# Patient Record
Sex: MALE | Race: WHITE | NOT HISPANIC OR LATINO | Employment: OTHER | URBAN - METROPOLITAN AREA
[De-identification: names, ages, dates, MRNs, and addresses within clinical notes are randomized per-mention and may not be internally consistent; named-entity substitution may affect disease eponyms.]

---

## 2017-05-12 ENCOUNTER — RECORDS - HEALTHEAST (OUTPATIENT)
Dept: LAB | Facility: CLINIC | Age: 67
End: 2017-05-12

## 2017-05-12 LAB
CHOLEST SERPL-MCNC: 134 MG/DL
FASTING STATUS PATIENT QL REPORTED: YES
HDLC SERPL-MCNC: 37 MG/DL
LDLC SERPL CALC-MCNC: 48 MG/DL
PSA SERPL-MCNC: 1.9 NG/ML (ref 0–4.5)
TRIGL SERPL-MCNC: 244 MG/DL

## 2017-09-27 ENCOUNTER — RECORDS - HEALTHEAST (OUTPATIENT)
Dept: LAB | Facility: CLINIC | Age: 67
End: 2017-09-27

## 2017-09-27 LAB
CHOLEST SERPL-MCNC: 141 MG/DL
FASTING STATUS PATIENT QL REPORTED: YES
HDLC SERPL-MCNC: 41 MG/DL
LDLC SERPL CALC-MCNC: 56 MG/DL
TRIGL SERPL-MCNC: 220 MG/DL

## 2018-08-03 ENCOUNTER — RECORDS - HEALTHEAST (OUTPATIENT)
Dept: LAB | Facility: CLINIC | Age: 68
End: 2018-08-03

## 2018-08-03 LAB
ALBUMIN SERPL-MCNC: 4.2 G/DL (ref 3.5–5)
ALP SERPL-CCNC: 55 U/L (ref 45–120)
ALT SERPL W P-5'-P-CCNC: 39 U/L (ref 0–45)
ANION GAP SERPL CALCULATED.3IONS-SCNC: 10 MMOL/L (ref 5–18)
AST SERPL W P-5'-P-CCNC: 25 U/L (ref 0–40)
BASOPHILS # BLD AUTO: 0.1 THOU/UL (ref 0–0.2)
BASOPHILS NFR BLD AUTO: 1 % (ref 0–2)
BILIRUB SERPL-MCNC: 1 MG/DL (ref 0–1)
BUN SERPL-MCNC: 20 MG/DL (ref 8–22)
CALCIUM SERPL-MCNC: 9.2 MG/DL (ref 8.5–10.5)
CHLORIDE BLD-SCNC: 98 MMOL/L (ref 98–107)
CHOLEST SERPL-MCNC: 135 MG/DL
CO2 SERPL-SCNC: 28 MMOL/L (ref 22–31)
CREAT SERPL-MCNC: 0.89 MG/DL (ref 0.7–1.3)
EOSINOPHIL # BLD AUTO: 0.5 THOU/UL (ref 0–0.4)
EOSINOPHIL NFR BLD AUTO: 8 % (ref 0–6)
ERYTHROCYTE [DISTWIDTH] IN BLOOD BY AUTOMATED COUNT: 12.6 % (ref 11–14.5)
FASTING STATUS PATIENT QL REPORTED: YES
GFR SERPL CREATININE-BSD FRML MDRD: >60 ML/MIN/1.73M2
GLUCOSE BLD-MCNC: 195 MG/DL (ref 70–125)
HCT VFR BLD AUTO: 44 % (ref 40–54)
HDLC SERPL-MCNC: 39 MG/DL
HGB BLD-MCNC: 15.4 G/DL (ref 14–18)
LDLC SERPL CALC-MCNC: 53 MG/DL
LYMPHOCYTES # BLD AUTO: 1.9 THOU/UL (ref 0.8–4.4)
LYMPHOCYTES NFR BLD AUTO: 33 % (ref 20–40)
MCH RBC QN AUTO: 30.9 PG (ref 27–34)
MCHC RBC AUTO-ENTMCNC: 35 G/DL (ref 32–36)
MCV RBC AUTO: 88 FL (ref 80–100)
MONOCYTES # BLD AUTO: 0.5 THOU/UL (ref 0–0.9)
MONOCYTES NFR BLD AUTO: 9 % (ref 2–10)
NEUTROPHILS # BLD AUTO: 2.9 THOU/UL (ref 2–7.7)
NEUTROPHILS NFR BLD AUTO: 50 % (ref 50–70)
PLATELET # BLD AUTO: 255 THOU/UL (ref 140–440)
PMV BLD AUTO: 9.6 FL (ref 8.5–12.5)
POTASSIUM BLD-SCNC: 3.8 MMOL/L (ref 3.5–5)
PROT SERPL-MCNC: 6.8 G/DL (ref 6–8)
PSA SERPL-MCNC: 1.7 NG/ML (ref 0–4.5)
RBC # BLD AUTO: 4.98 MILL/UL (ref 4.4–6.2)
SODIUM SERPL-SCNC: 136 MMOL/L (ref 136–145)
TRIGL SERPL-MCNC: 215 MG/DL
TSH SERPL DL<=0.005 MIU/L-ACNC: 3.06 UIU/ML (ref 0.3–5)
WBC: 5.8 THOU/UL (ref 4–11)

## 2018-11-12 ENCOUNTER — RECORDS - HEALTHEAST (OUTPATIENT)
Dept: LAB | Facility: CLINIC | Age: 68
End: 2018-11-12

## 2018-11-12 LAB
ALBUMIN SERPL-MCNC: 4 G/DL (ref 3.5–5)
ALP SERPL-CCNC: 62 U/L (ref 45–120)
ALT SERPL W P-5'-P-CCNC: 31 U/L (ref 0–45)
ANION GAP SERPL CALCULATED.3IONS-SCNC: 11 MMOL/L (ref 5–18)
AST SERPL W P-5'-P-CCNC: 20 U/L (ref 0–40)
BASOPHILS # BLD AUTO: 0.1 THOU/UL (ref 0–0.2)
BASOPHILS NFR BLD AUTO: 1 % (ref 0–2)
BILIRUB SERPL-MCNC: 1 MG/DL (ref 0–1)
BUN SERPL-MCNC: 23 MG/DL (ref 8–22)
CALCIUM SERPL-MCNC: 10 MG/DL (ref 8.5–10.5)
CHLORIDE BLD-SCNC: 100 MMOL/L (ref 98–107)
CHOLEST SERPL-MCNC: 135 MG/DL
CO2 SERPL-SCNC: 30 MMOL/L (ref 22–31)
CREAT SERPL-MCNC: 0.96 MG/DL (ref 0.7–1.3)
EOSINOPHIL # BLD AUTO: 0.5 THOU/UL (ref 0–0.4)
EOSINOPHIL NFR BLD AUTO: 7 % (ref 0–6)
ERYTHROCYTE [DISTWIDTH] IN BLOOD BY AUTOMATED COUNT: 12.5 % (ref 11–14.5)
FASTING STATUS PATIENT QL REPORTED: YES
GFR SERPL CREATININE-BSD FRML MDRD: >60 ML/MIN/1.73M2
GLUCOSE BLD-MCNC: 163 MG/DL (ref 70–125)
HCT VFR BLD AUTO: 43 % (ref 40–54)
HDLC SERPL-MCNC: 41 MG/DL
HGB BLD-MCNC: 14.8 G/DL (ref 14–18)
LDLC SERPL CALC-MCNC: 59 MG/DL
LYMPHOCYTES # BLD AUTO: 2.1 THOU/UL (ref 0.8–4.4)
LYMPHOCYTES NFR BLD AUTO: 28 % (ref 20–40)
MCH RBC QN AUTO: 30.9 PG (ref 27–34)
MCHC RBC AUTO-ENTMCNC: 34.4 G/DL (ref 32–36)
MCV RBC AUTO: 90 FL (ref 80–100)
MONOCYTES # BLD AUTO: 0.6 THOU/UL (ref 0–0.9)
MONOCYTES NFR BLD AUTO: 8 % (ref 2–10)
NEUTROPHILS # BLD AUTO: 4.1 THOU/UL (ref 2–7.7)
NEUTROPHILS NFR BLD AUTO: 56 % (ref 50–70)
PLATELET # BLD AUTO: 263 THOU/UL (ref 140–440)
PMV BLD AUTO: 9.5 FL (ref 8.5–12.5)
POTASSIUM BLD-SCNC: 4.5 MMOL/L (ref 3.5–5)
PROT SERPL-MCNC: 7 G/DL (ref 6–8)
RBC # BLD AUTO: 4.79 MILL/UL (ref 4.4–6.2)
SODIUM SERPL-SCNC: 141 MMOL/L (ref 136–145)
TRIGL SERPL-MCNC: 174 MG/DL
WBC: 7.5 THOU/UL (ref 4–11)

## 2019-04-15 ENCOUNTER — RECORDS - HEALTHEAST (OUTPATIENT)
Dept: LAB | Facility: CLINIC | Age: 69
End: 2019-04-15

## 2019-04-15 LAB
ALBUMIN SERPL-MCNC: 4.3 G/DL (ref 3.5–5)
ALP SERPL-CCNC: 51 U/L (ref 45–120)
ALT SERPL W P-5'-P-CCNC: 44 U/L (ref 0–45)
ANION GAP SERPL CALCULATED.3IONS-SCNC: 11 MMOL/L (ref 5–18)
AST SERPL W P-5'-P-CCNC: 29 U/L (ref 0–40)
BASOPHILS # BLD AUTO: 0.1 THOU/UL (ref 0–0.2)
BASOPHILS NFR BLD AUTO: 1 % (ref 0–2)
BILIRUB SERPL-MCNC: 0.7 MG/DL (ref 0–1)
BUN SERPL-MCNC: 17 MG/DL (ref 8–22)
CALCIUM SERPL-MCNC: 9.7 MG/DL (ref 8.5–10.5)
CHLORIDE BLD-SCNC: 101 MMOL/L (ref 98–107)
CHOLEST SERPL-MCNC: 114 MG/DL
CO2 SERPL-SCNC: 28 MMOL/L (ref 22–31)
CREAT SERPL-MCNC: 1 MG/DL (ref 0.7–1.3)
EOSINOPHIL # BLD AUTO: 0.5 THOU/UL (ref 0–0.4)
EOSINOPHIL NFR BLD AUTO: 8 % (ref 0–6)
ERYTHROCYTE [DISTWIDTH] IN BLOOD BY AUTOMATED COUNT: 13.2 % (ref 11–14.5)
FASTING STATUS PATIENT QL REPORTED: YES
GFR SERPL CREATININE-BSD FRML MDRD: >60 ML/MIN/1.73M2
GLUCOSE BLD-MCNC: 184 MG/DL (ref 70–125)
HCT VFR BLD AUTO: 44.2 % (ref 40–54)
HDLC SERPL-MCNC: 39 MG/DL
HGB BLD-MCNC: 15 G/DL (ref 14–18)
LDLC SERPL CALC-MCNC: 42 MG/DL
LYMPHOCYTES # BLD AUTO: 1.8 THOU/UL (ref 0.8–4.4)
LYMPHOCYTES NFR BLD AUTO: 29 % (ref 20–40)
MCH RBC QN AUTO: 31.1 PG (ref 27–34)
MCHC RBC AUTO-ENTMCNC: 33.9 G/DL (ref 32–36)
MCV RBC AUTO: 92 FL (ref 80–100)
MONOCYTES # BLD AUTO: 0.5 THOU/UL (ref 0–0.9)
MONOCYTES NFR BLD AUTO: 8 % (ref 2–10)
NEUTROPHILS # BLD AUTO: 3.2 THOU/UL (ref 2–7.7)
NEUTROPHILS NFR BLD AUTO: 54 % (ref 50–70)
PLATELET # BLD AUTO: 264 THOU/UL (ref 140–440)
PMV BLD AUTO: 9.6 FL (ref 8.5–12.5)
POTASSIUM BLD-SCNC: 4.3 MMOL/L (ref 3.5–5)
PROT SERPL-MCNC: 7 G/DL (ref 6–8)
RBC # BLD AUTO: 4.82 MILL/UL (ref 4.4–6.2)
SODIUM SERPL-SCNC: 140 MMOL/L (ref 136–145)
TRIGL SERPL-MCNC: 166 MG/DL
TSH SERPL DL<=0.005 MIU/L-ACNC: 3.04 UIU/ML (ref 0.3–5)
WBC: 6 THOU/UL (ref 4–11)

## 2019-04-16 LAB — HBA1C MFR BLD: 7.9 % (ref 4.2–6.1)

## 2019-12-12 ENCOUNTER — RECORDS - HEALTHEAST (OUTPATIENT)
Dept: LAB | Facility: CLINIC | Age: 69
End: 2019-12-12

## 2019-12-12 LAB
ALBUMIN SERPL-MCNC: 4.6 G/DL (ref 3.5–5)
ALP SERPL-CCNC: 58 U/L (ref 45–120)
ALT SERPL W P-5'-P-CCNC: 32 U/L (ref 0–45)
ANION GAP SERPL CALCULATED.3IONS-SCNC: 11 MMOL/L (ref 5–18)
AST SERPL W P-5'-P-CCNC: 22 U/L (ref 0–40)
BILIRUB SERPL-MCNC: 1.2 MG/DL (ref 0–1)
BUN SERPL-MCNC: 22 MG/DL (ref 8–22)
CALCIUM SERPL-MCNC: 9.8 MG/DL (ref 8.5–10.5)
CHLORIDE BLD-SCNC: 98 MMOL/L (ref 98–107)
CHOLEST SERPL-MCNC: 117 MG/DL
CO2 SERPL-SCNC: 29 MMOL/L (ref 22–31)
CREAT SERPL-MCNC: 1.08 MG/DL (ref 0.7–1.3)
FASTING STATUS PATIENT QL REPORTED: YES
GFR SERPL CREATININE-BSD FRML MDRD: >60 ML/MIN/1.73M2
GLUCOSE BLD-MCNC: 175 MG/DL (ref 70–125)
HDLC SERPL-MCNC: 36 MG/DL
LDLC SERPL CALC-MCNC: 42 MG/DL
POTASSIUM BLD-SCNC: 4.2 MMOL/L (ref 3.5–5)
PROT SERPL-MCNC: 7.4 G/DL (ref 6–8)
SODIUM SERPL-SCNC: 138 MMOL/L (ref 136–145)
TRIGL SERPL-MCNC: 195 MG/DL

## 2019-12-16 ENCOUNTER — RECORDS - HEALTHEAST (OUTPATIENT)
Dept: LAB | Facility: CLINIC | Age: 69
End: 2019-12-16

## 2019-12-16 LAB — PSA SERPL-MCNC: 1.8 NG/ML (ref 0–4.5)

## 2020-04-17 ENCOUNTER — RECORDS - HEALTHEAST (OUTPATIENT)
Dept: LAB | Facility: CLINIC | Age: 70
End: 2020-04-17

## 2020-04-17 LAB
ALBUMIN SERPL-MCNC: 4.3 G/DL (ref 3.5–5)
ALP SERPL-CCNC: 63 U/L (ref 45–120)
ALT SERPL W P-5'-P-CCNC: 22 U/L (ref 0–45)
ANION GAP SERPL CALCULATED.3IONS-SCNC: 12 MMOL/L (ref 5–18)
AST SERPL W P-5'-P-CCNC: 18 U/L (ref 0–40)
BILIRUB SERPL-MCNC: 0.7 MG/DL (ref 0–1)
BUN SERPL-MCNC: 23 MG/DL (ref 8–22)
CALCIUM SERPL-MCNC: 9.7 MG/DL (ref 8.5–10.5)
CHLORIDE BLD-SCNC: 97 MMOL/L (ref 98–107)
CHOLEST SERPL-MCNC: 116 MG/DL
CO2 SERPL-SCNC: 28 MMOL/L (ref 22–31)
CREAT SERPL-MCNC: 0.93 MG/DL (ref 0.7–1.3)
FASTING STATUS PATIENT QL REPORTED: YES
GFR SERPL CREATININE-BSD FRML MDRD: >60 ML/MIN/1.73M2
GLUCOSE BLD-MCNC: 109 MG/DL (ref 70–125)
HDLC SERPL-MCNC: 37 MG/DL
LDLC SERPL CALC-MCNC: 56 MG/DL
POTASSIUM BLD-SCNC: 3.9 MMOL/L (ref 3.5–5)
PROT SERPL-MCNC: 7 G/DL (ref 6–8)
SODIUM SERPL-SCNC: 137 MMOL/L (ref 136–145)
TRIGL SERPL-MCNC: 116 MG/DL
TSH SERPL DL<=0.005 MIU/L-ACNC: 3.28 UIU/ML (ref 0.3–5)

## 2021-01-15 ENCOUNTER — AMBULATORY - HEALTHEAST (OUTPATIENT)
Dept: SURGERY | Facility: CLINIC | Age: 71
End: 2021-01-15

## 2021-01-15 DIAGNOSIS — Z11.59 ENCOUNTER FOR SCREENING FOR OTHER VIRAL DISEASES: ICD-10-CM

## 2021-02-09 ENCOUNTER — RECORDS - HEALTHEAST (OUTPATIENT)
Dept: LAB | Facility: CLINIC | Age: 71
End: 2021-02-09

## 2021-02-09 LAB
ALBUMIN SERPL-MCNC: 4.3 G/DL (ref 3.5–5)
ANION GAP SERPL CALCULATED.3IONS-SCNC: 8 MMOL/L (ref 5–18)
BUN SERPL-MCNC: 22 MG/DL (ref 8–28)
CALCIUM SERPL-MCNC: 9.2 MG/DL (ref 8.5–10.5)
CHLORIDE BLD-SCNC: 100 MMOL/L (ref 98–107)
CO2 SERPL-SCNC: 31 MMOL/L (ref 22–31)
CREAT SERPL-MCNC: 0.94 MG/DL (ref 0.7–1.3)
GFR SERPL CREATININE-BSD FRML MDRD: >60 ML/MIN/1.73M2
GLUCOSE BLD-MCNC: 162 MG/DL (ref 70–125)
PHOSPHATE SERPL-MCNC: 3.1 MG/DL (ref 2.5–4.5)
POTASSIUM BLD-SCNC: 3.7 MMOL/L (ref 3.5–5)
SODIUM SERPL-SCNC: 139 MMOL/L (ref 136–145)

## 2021-02-24 ENCOUNTER — RECORDS - HEALTHEAST (OUTPATIENT)
Dept: LAB | Facility: CLINIC | Age: 71
End: 2021-02-24

## 2021-02-24 LAB
SARS-COV-2 PCR COMMENT: NORMAL
SARS-COV-2 RNA SPEC QL NAA+PROBE: NEGATIVE
SARS-COV-2 VIRUS SPECIMEN SOURCE: NORMAL

## 2021-02-24 ASSESSMENT — MIFFLIN-ST. JEOR: SCORE: 1682.05

## 2021-02-25 ENCOUNTER — ANESTHESIA - HEALTHEAST (OUTPATIENT)
Dept: SURGERY | Facility: CLINIC | Age: 71
End: 2021-02-25

## 2021-02-26 ENCOUNTER — SURGERY - HEALTHEAST (OUTPATIENT)
Dept: SURGERY | Facility: CLINIC | Age: 71
End: 2021-02-26

## 2021-02-26 ASSESSMENT — MIFFLIN-ST. JEOR: SCORE: 1672.98

## 2021-05-25 ENCOUNTER — RECORDS - HEALTHEAST (OUTPATIENT)
Dept: ADMINISTRATIVE | Facility: CLINIC | Age: 71
End: 2021-05-25

## 2021-05-30 ENCOUNTER — RECORDS - HEALTHEAST (OUTPATIENT)
Dept: ADMINISTRATIVE | Facility: CLINIC | Age: 71
End: 2021-05-30

## 2021-06-05 VITALS — WEIGHT: 208 LBS | HEIGHT: 68 IN | BODY MASS INDEX: 31.52 KG/M2

## 2021-06-15 NOTE — ANESTHESIA PROCEDURE NOTES
Peripheral Block    Patient location during procedure: pre-op  Start time: 2/26/2021 7:00 AM  End time: 2/26/2021 7:01 AM  post-op analgesia per surgeon order as noted in medical record  Staffing:  Performing  Anesthesiologist: Jamilah Torres MD  Preanesthetic Checklist  Completed: patient identified, site marked, risks, benefits, and alternatives discussed, timeout performed, consent obtained, airway assessed, oxygen available, suction available, emergency drugs available and hand hygiene performed  Peripheral Block  Block type: other, superficial cervical plexus  Prep: ChloraPrep  Patient position: right lateral decubitus  Patient monitoring: cardiac monitor, continuous pulse oximetry, heart rate and blood pressure  Laterality: left  Injection technique: ultrasound guided    Ultrasound used to visualize needle placement in proximity to nerve being blocked: yes   US used to visualize anesthetic spread    Permanent ultrasound image captured for medical record  Sterile gel and probe cover used for ultrasound.  Needle  Needle type: Stimuplex   Needle gauge: 20G  Needle length: 4 in  no peripheral nerve catheter placed  Assessment  Injection assessment: no difficulty with injection, negative aspiration for heme, no paresthesia on injection and incremental injection

## 2021-06-15 NOTE — ANESTHESIA CARE TRANSFER NOTE
Last vitals:   Vitals:    02/26/21 0925   BP: 133/75   Pulse: 74   Resp: 11   Temp: 36.1  C (97  F)   SpO2: 94%     Patient's level of consciousness is drowsy  Spontaneous respirations: yes  Maintains airway independently: yes  Dentition unchanged: yes  Oropharynx: oropharynx clear of all foreign objects    QCDR Measures:  ASA# 20 - Surgical Safety Checklist: WHO surgical safety checklist completed prior to induction    PQRS# 430 - Adult PONV Prevention: 4558F - Pt received => 2 anti-emetic agents (different classes) preop & intraop  ASA# 8 - Peds PONV Prevention: NA - Not pediatric patient, not GA or 2 or more risk factors NOT present  PQRS# 424 - Jyotsna-op Temp Management: 4559F - At least one body temp DOCUMENTED => 35.5C or 95.9F within required timeframe  PQRS# 426 - PACU Transfer Protocol: - Transfer of care checklist used  ASA# 14 - Acute Post-op Pain: ASA14B - Patient did NOT experience pain >= 7 out of 10

## 2021-06-15 NOTE — ANESTHESIA PREPROCEDURE EVALUATION
Anesthesia Evaluation      Patient summary reviewed   No history of anesthetic complications     Airway   Mallampati: II  Neck ROM: full   Pulmonary - normal exam                          Cardiovascular - normal exam  (+) hypertension, CAD, ,      Neuro/Psych      Endo/Other    (+) diabetes mellitus, obesity,      GI/Hepatic/Renal            Dental - normal exam                        Anesthesia Plan  Planned anesthetic: peripheral nerve block and general endotracheal    ASA 2     Anesthetic plan and risks discussed with: patient  Anesthesia plan special considerations: antiemetics,   Post-op plan: routine recovery

## 2021-06-15 NOTE — ANESTHESIA PROCEDURE NOTES
Peripheral Block    Patient location during procedure: pre-op  Start time: 2/26/2021 6:57 AM  End time: 2/26/2021 6:59 AM  post-op analgesia per surgeon order as noted in medical record  Staffing:  Performing  Anesthesiologist: Jamilah Torres MD  Preanesthetic Checklist  Completed: patient identified, site marked, risks, benefits, and alternatives discussed, timeout performed, consent obtained, airway assessed, oxygen available, suction available, emergency drugs available and hand hygiene performed  Peripheral Block  Block type: brachial plexus, interscalene  Prep: ChloraPrep  Patient position: right lateral decubitus  Patient monitoring: cardiac monitor, continuous pulse oximetry, heart rate and blood pressure  Laterality: left  Injection technique: ultrasound guided    Ultrasound used to visualize needle placement in proximity to nerve being blocked: yes   US used to visualize anesthetic spread    Permanent ultrasound image captured for medical record  Sterile gel and probe cover used for ultrasound.  Needle  Needle type: Stimuplex   Needle gauge: 20G  Needle length: 4 in  no peripheral nerve catheter placed  Assessment  Injection assessment: no difficulty with injection, negative aspiration for heme, no paresthesia on injection and incremental injection

## 2021-06-15 NOTE — ANESTHESIA POSTPROCEDURE EVALUATION
Patient: Antonio Hidalgo  Procedure(s):  LEFT TOTAL SHOULDER ARTHROPLASTY (Left)  Anesthesia type: spinal    Patient location: PACU  Last vitals:   Vitals Value Taken Time   /74 02/26/21 1115   Temp 36.8  C (98.2  F) 02/26/21 1115   Pulse 75 02/26/21 1115   Resp 20 02/26/21 1115   SpO2 96 % 02/26/21 1115     Post vital signs: stable  Level of consciousness: awake and responds to simple questions  Post-anesthesia pain: pain controlled  Post-anesthesia nausea and vomiting: no  Pulmonary: unassisted, return to baseline  Cardiovascular: stable and blood pressure at baseline  Hydration: adequate  Anesthetic events: no    QCDR Measures:  ASA# 11 - Jyotsna-op Cardiac Arrest: ASA11B - Patient did NOT experience unanticipated cardiac arrest  ASA# 12 - Jyotsna-op Mortality Rate: ASA12B - Patient did NOT die  ASA# 13 - PACU Re-Intubation Rate: ASA13B - Patient did NOT require a new airway mgmt  ASA# 10 - Composite Anes Safety: ASA10A - No serious adverse event    Additional Notes:

## 2021-12-01 ENCOUNTER — LAB REQUISITION (OUTPATIENT)
Dept: LAB | Facility: CLINIC | Age: 71
End: 2021-12-01

## 2021-12-01 DIAGNOSIS — I10 ESSENTIAL (PRIMARY) HYPERTENSION: ICD-10-CM

## 2021-12-01 DIAGNOSIS — E78.5 HYPERLIPIDEMIA, UNSPECIFIED: ICD-10-CM

## 2021-12-01 LAB
ALBUMIN SERPL-MCNC: 4.1 G/DL (ref 3.5–5)
ALP SERPL-CCNC: 48 U/L (ref 45–120)
ALT SERPL W P-5'-P-CCNC: 23 U/L (ref 0–45)
ANION GAP SERPL CALCULATED.3IONS-SCNC: 10 MMOL/L (ref 5–18)
AST SERPL W P-5'-P-CCNC: 19 U/L (ref 0–40)
BILIRUB SERPL-MCNC: 0.7 MG/DL (ref 0–1)
BUN SERPL-MCNC: 28 MG/DL (ref 8–28)
CALCIUM SERPL-MCNC: 9.7 MG/DL (ref 8.5–10.5)
CHLORIDE BLD-SCNC: 100 MMOL/L (ref 98–107)
CHOLEST SERPL-MCNC: 129 MG/DL
CO2 SERPL-SCNC: 30 MMOL/L (ref 22–31)
CREAT SERPL-MCNC: 1.15 MG/DL (ref 0.7–1.3)
ERYTHROCYTE [DISTWIDTH] IN BLOOD BY AUTOMATED COUNT: 12.5 % (ref 10–15)
GFR SERPL CREATININE-BSD FRML MDRD: 64 ML/MIN/1.73M2
GLUCOSE BLD-MCNC: 132 MG/DL (ref 70–125)
HCT VFR BLD AUTO: 46.3 % (ref 40–53)
HDLC SERPL-MCNC: 46 MG/DL
HGB BLD-MCNC: 15.9 G/DL (ref 13.3–17.7)
LDLC SERPL CALC-MCNC: 51 MG/DL
MCH RBC QN AUTO: 31 PG (ref 26.5–33)
MCHC RBC AUTO-ENTMCNC: 34.3 G/DL (ref 31.5–36.5)
MCV RBC AUTO: 90 FL (ref 78–100)
PLATELET # BLD AUTO: 229 10E3/UL (ref 150–450)
POTASSIUM BLD-SCNC: 4.2 MMOL/L (ref 3.5–5)
PROT SERPL-MCNC: 6.7 G/DL (ref 6–8)
RBC # BLD AUTO: 5.13 10E6/UL (ref 4.4–5.9)
SODIUM SERPL-SCNC: 140 MMOL/L (ref 136–145)
TRIGL SERPL-MCNC: 159 MG/DL
TSH SERPL DL<=0.005 MIU/L-ACNC: 4.08 UIU/ML (ref 0.3–5)
WBC # BLD AUTO: 6.4 10E3/UL (ref 4–11)

## 2021-12-01 PROCEDURE — 85027 COMPLETE CBC AUTOMATED: CPT | Performed by: FAMILY MEDICINE

## 2021-12-01 PROCEDURE — 80053 COMPREHEN METABOLIC PANEL: CPT | Performed by: FAMILY MEDICINE

## 2021-12-01 PROCEDURE — 80061 LIPID PANEL: CPT | Performed by: FAMILY MEDICINE

## 2021-12-01 PROCEDURE — 84443 ASSAY THYROID STIM HORMONE: CPT | Performed by: FAMILY MEDICINE

## 2021-12-06 ENCOUNTER — LAB REQUISITION (OUTPATIENT)
Dept: LAB | Facility: CLINIC | Age: 71
End: 2021-12-06

## 2021-12-06 DIAGNOSIS — Z12.5 ENCOUNTER FOR SCREENING FOR MALIGNANT NEOPLASM OF PROSTATE: ICD-10-CM

## 2021-12-06 LAB — PSA SERPL-MCNC: 1.92 UG/L (ref 0–6.5)

## 2021-12-06 PROCEDURE — G0103 PSA SCREENING: HCPCS | Performed by: FAMILY MEDICINE

## 2022-05-13 ENCOUNTER — LAB REQUISITION (OUTPATIENT)
Dept: LAB | Facility: CLINIC | Age: 72
End: 2022-05-13

## 2022-05-13 DIAGNOSIS — E11.9 TYPE 2 DIABETES MELLITUS WITHOUT COMPLICATIONS (H): ICD-10-CM

## 2022-05-13 DIAGNOSIS — I10 ESSENTIAL (PRIMARY) HYPERTENSION: ICD-10-CM

## 2022-05-13 DIAGNOSIS — E78.5 HYPERLIPIDEMIA, UNSPECIFIED: ICD-10-CM

## 2022-05-13 LAB
ALBUMIN SERPL-MCNC: 4.1 G/DL (ref 3.5–5)
ALP SERPL-CCNC: 45 U/L (ref 45–120)
ALT SERPL W P-5'-P-CCNC: 30 U/L (ref 0–45)
ANION GAP SERPL CALCULATED.3IONS-SCNC: 13 MMOL/L (ref 5–18)
AST SERPL W P-5'-P-CCNC: 26 U/L (ref 0–40)
BASOPHILS # BLD AUTO: 0.1 10E3/UL (ref 0–0.2)
BASOPHILS NFR BLD AUTO: 1 %
BILIRUB SERPL-MCNC: 1 MG/DL (ref 0–1)
BUN SERPL-MCNC: 21 MG/DL (ref 8–28)
CALCIUM SERPL-MCNC: 9.6 MG/DL (ref 8.5–10.5)
CHLORIDE BLD-SCNC: 100 MMOL/L (ref 98–107)
CHOLEST SERPL-MCNC: 119 MG/DL
CO2 SERPL-SCNC: 28 MMOL/L (ref 22–31)
CREAT SERPL-MCNC: 0.96 MG/DL (ref 0.7–1.3)
EOSINOPHIL # BLD AUTO: 0.3 10E3/UL (ref 0–0.7)
EOSINOPHIL NFR BLD AUTO: 4 %
ERYTHROCYTE [DISTWIDTH] IN BLOOD BY AUTOMATED COUNT: 12.8 % (ref 10–15)
FASTING STATUS PATIENT QL REPORTED: NORMAL
GFR SERPL CREATININE-BSD FRML MDRD: 85 ML/MIN/1.73M2
GLUCOSE BLD-MCNC: 107 MG/DL (ref 70–125)
HBA1C MFR BLD: 6.1 %
HCT VFR BLD AUTO: 44.2 % (ref 40–53)
HDLC SERPL-MCNC: 52 MG/DL
HGB BLD-MCNC: 15.3 G/DL (ref 13.3–17.7)
IMM GRANULOCYTES # BLD: 0 10E3/UL
IMM GRANULOCYTES NFR BLD: 0 %
LDLC SERPL CALC-MCNC: 50 MG/DL
LYMPHOCYTES # BLD AUTO: 2.1 10E3/UL (ref 0.8–5.3)
LYMPHOCYTES NFR BLD AUTO: 29 %
MCH RBC QN AUTO: 31.9 PG (ref 26.5–33)
MCHC RBC AUTO-ENTMCNC: 34.6 G/DL (ref 31.5–36.5)
MCV RBC AUTO: 92 FL (ref 78–100)
MONOCYTES # BLD AUTO: 0.6 10E3/UL (ref 0–1.3)
MONOCYTES NFR BLD AUTO: 8 %
NEUTROPHILS # BLD AUTO: 4.3 10E3/UL (ref 1.6–8.3)
NEUTROPHILS NFR BLD AUTO: 58 %
NRBC # BLD AUTO: 0 10E3/UL
NRBC BLD AUTO-RTO: 0 /100
PLATELET # BLD AUTO: 271 10E3/UL (ref 150–450)
POTASSIUM BLD-SCNC: 4.1 MMOL/L (ref 3.5–5)
PROT SERPL-MCNC: 6.6 G/DL (ref 6–8)
RBC # BLD AUTO: 4.8 10E6/UL (ref 4.4–5.9)
SODIUM SERPL-SCNC: 141 MMOL/L (ref 136–145)
TRIGL SERPL-MCNC: 84 MG/DL
TSH SERPL DL<=0.005 MIU/L-ACNC: 2.48 UIU/ML (ref 0.3–5)
WBC # BLD AUTO: 7.4 10E3/UL (ref 4–11)

## 2022-05-13 PROCEDURE — 84443 ASSAY THYROID STIM HORMONE: CPT | Performed by: STUDENT IN AN ORGANIZED HEALTH CARE EDUCATION/TRAINING PROGRAM

## 2022-05-13 PROCEDURE — 85004 AUTOMATED DIFF WBC COUNT: CPT | Performed by: STUDENT IN AN ORGANIZED HEALTH CARE EDUCATION/TRAINING PROGRAM

## 2022-05-13 PROCEDURE — 83036 HEMOGLOBIN GLYCOSYLATED A1C: CPT | Performed by: STUDENT IN AN ORGANIZED HEALTH CARE EDUCATION/TRAINING PROGRAM

## 2022-05-13 PROCEDURE — 80053 COMPREHEN METABOLIC PANEL: CPT | Performed by: STUDENT IN AN ORGANIZED HEALTH CARE EDUCATION/TRAINING PROGRAM

## 2022-05-13 PROCEDURE — 80061 LIPID PANEL: CPT | Performed by: STUDENT IN AN ORGANIZED HEALTH CARE EDUCATION/TRAINING PROGRAM

## 2022-06-12 ENCOUNTER — LAB REQUISITION (OUTPATIENT)
Dept: LAB | Facility: CLINIC | Age: 72
End: 2022-06-12

## 2022-06-12 PROCEDURE — 87086 URINE CULTURE/COLONY COUNT: CPT | Performed by: FAMILY MEDICINE

## 2022-06-14 LAB — BACTERIA UR CULT: NORMAL

## 2022-12-21 ENCOUNTER — LAB REQUISITION (OUTPATIENT)
Dept: LAB | Facility: CLINIC | Age: 72
End: 2022-12-21

## 2022-12-21 DIAGNOSIS — I25.119 ATHEROSCLEROTIC HEART DISEASE OF NATIVE CORONARY ARTERY WITH UNSPECIFIED ANGINA PECTORIS (H): ICD-10-CM

## 2022-12-21 DIAGNOSIS — I10 ESSENTIAL (PRIMARY) HYPERTENSION: ICD-10-CM

## 2022-12-21 DIAGNOSIS — E78.5 HYPERLIPIDEMIA, UNSPECIFIED: ICD-10-CM

## 2022-12-21 LAB
ALBUMIN SERPL BCG-MCNC: 4.4 G/DL (ref 3.5–5.2)
ALP SERPL-CCNC: 73 U/L (ref 40–129)
ALT SERPL W P-5'-P-CCNC: 35 U/L (ref 10–50)
ANION GAP SERPL CALCULATED.3IONS-SCNC: 15 MMOL/L (ref 7–15)
AST SERPL W P-5'-P-CCNC: 28 U/L (ref 10–50)
BILIRUB SERPL-MCNC: 0.7 MG/DL
BUN SERPL-MCNC: 14.4 MG/DL (ref 8–23)
CALCIUM SERPL-MCNC: 9.9 MG/DL (ref 8.8–10.2)
CHLORIDE SERPL-SCNC: 96 MMOL/L (ref 98–107)
CHOLEST SERPL-MCNC: 110 MG/DL
CREAT SERPL-MCNC: 0.95 MG/DL (ref 0.67–1.17)
DEPRECATED HCO3 PLAS-SCNC: 27 MMOL/L (ref 22–29)
GFR SERPL CREATININE-BSD FRML MDRD: 85 ML/MIN/1.73M2
GLUCOSE SERPL-MCNC: 135 MG/DL (ref 70–99)
HDLC SERPL-MCNC: 44 MG/DL
LDLC SERPL CALC-MCNC: 35 MG/DL
NONHDLC SERPL-MCNC: 66 MG/DL
POTASSIUM SERPL-SCNC: 4.7 MMOL/L (ref 3.4–5.3)
PROT SERPL-MCNC: 7 G/DL (ref 6.4–8.3)
SODIUM SERPL-SCNC: 138 MMOL/L (ref 136–145)
TRIGL SERPL-MCNC: 154 MG/DL

## 2022-12-21 PROCEDURE — 80053 COMPREHEN METABOLIC PANEL: CPT | Performed by: STUDENT IN AN ORGANIZED HEALTH CARE EDUCATION/TRAINING PROGRAM

## 2022-12-21 PROCEDURE — 80061 LIPID PANEL: CPT | Performed by: STUDENT IN AN ORGANIZED HEALTH CARE EDUCATION/TRAINING PROGRAM

## 2023-06-19 ENCOUNTER — LAB REQUISITION (OUTPATIENT)
Dept: LAB | Facility: CLINIC | Age: 73
End: 2023-06-19

## 2023-06-19 ENCOUNTER — TRANSFERRED RECORDS (OUTPATIENT)
Dept: HEALTH INFORMATION MANAGEMENT | Facility: CLINIC | Age: 73
End: 2023-06-19
Payer: COMMERCIAL

## 2023-06-19 ENCOUNTER — MEDICAL CORRESPONDENCE (OUTPATIENT)
Dept: HEALTH INFORMATION MANAGEMENT | Facility: CLINIC | Age: 73
End: 2023-06-19
Payer: COMMERCIAL

## 2023-06-19 DIAGNOSIS — Z12.5 ENCOUNTER FOR SCREENING FOR MALIGNANT NEOPLASM OF PROSTATE: ICD-10-CM

## 2023-06-19 DIAGNOSIS — E78.5 HYPERLIPIDEMIA, UNSPECIFIED: ICD-10-CM

## 2023-06-19 DIAGNOSIS — I10 ESSENTIAL (PRIMARY) HYPERTENSION: ICD-10-CM

## 2023-06-19 DIAGNOSIS — E11.9 TYPE 2 DIABETES MELLITUS WITHOUT COMPLICATIONS (H): ICD-10-CM

## 2023-06-19 PROCEDURE — 80053 COMPREHEN METABOLIC PANEL: CPT | Performed by: STUDENT IN AN ORGANIZED HEALTH CARE EDUCATION/TRAINING PROGRAM

## 2023-06-19 PROCEDURE — G0103 PSA SCREENING: HCPCS | Performed by: STUDENT IN AN ORGANIZED HEALTH CARE EDUCATION/TRAINING PROGRAM

## 2023-06-19 PROCEDURE — 80061 LIPID PANEL: CPT | Performed by: STUDENT IN AN ORGANIZED HEALTH CARE EDUCATION/TRAINING PROGRAM

## 2023-06-20 ENCOUNTER — TRANSCRIBE ORDERS (OUTPATIENT)
Dept: OTHER | Age: 73
End: 2023-06-20

## 2023-06-20 DIAGNOSIS — R25.1 TREMOR OF BOTH HANDS: Primary | ICD-10-CM

## 2023-06-20 LAB
ALBUMIN SERPL BCG-MCNC: 4.6 G/DL (ref 3.5–5.2)
ALP SERPL-CCNC: 51 U/L (ref 40–129)
ALT SERPL W P-5'-P-CCNC: 36 U/L (ref 0–70)
ANION GAP SERPL CALCULATED.3IONS-SCNC: 13 MMOL/L (ref 7–15)
AST SERPL W P-5'-P-CCNC: 27 U/L (ref 0–45)
BILIRUB SERPL-MCNC: 0.7 MG/DL
BUN SERPL-MCNC: 20.7 MG/DL (ref 8–23)
CALCIUM SERPL-MCNC: 9.5 MG/DL (ref 8.8–10.2)
CHLORIDE SERPL-SCNC: 98 MMOL/L (ref 98–107)
CHOLEST SERPL-MCNC: 116 MG/DL
CREAT SERPL-MCNC: 0.93 MG/DL (ref 0.67–1.17)
DEPRECATED HCO3 PLAS-SCNC: 26 MMOL/L (ref 22–29)
GFR SERPL CREATININE-BSD FRML MDRD: 87 ML/MIN/1.73M2
GLUCOSE SERPL-MCNC: 163 MG/DL (ref 70–99)
HDLC SERPL-MCNC: 44 MG/DL
LDLC SERPL CALC-MCNC: 41 MG/DL
NONHDLC SERPL-MCNC: 72 MG/DL
POTASSIUM SERPL-SCNC: 4.1 MMOL/L (ref 3.4–5.3)
PROT SERPL-MCNC: 6.7 G/DL (ref 6.4–8.3)
PSA SERPL DL<=0.01 NG/ML-MCNC: 1.46 NG/ML (ref 0–6.5)
SODIUM SERPL-SCNC: 137 MMOL/L (ref 136–145)
TRIGL SERPL-MCNC: 154 MG/DL

## 2023-06-30 PROBLEM — E78.5 DYSLIPIDEMIA: Status: ACTIVE | Noted: 2018-11-09

## 2023-06-30 PROBLEM — E11.9 DM2 (DIABETES MELLITUS, TYPE 2) (H): Status: ACTIVE | Noted: 2018-11-09

## 2023-06-30 PROBLEM — I10 ESSENTIAL HYPERTENSION: Status: ACTIVE | Noted: 2018-11-09

## 2023-06-30 PROBLEM — I25.10 ATHEROSCLEROSIS OF NATIVE CORONARY ARTERY OF NATIVE HEART WITHOUT ANGINA PECTORIS: Status: ACTIVE | Noted: 2023-06-30

## 2023-06-30 NOTE — PROGRESS NOTES
NEUROLOGY CONSULTATION NOTE       [facility] [place]  [address] [place] [address]  Tel: [phone]  Fax: [phone]  [email or web address]     Antonio Hidalgo,  1950, MRN [id number]  PCP: Blake Silva  Date: 2023     ASSESSMENT & PLAN     Visit Diagnosis  1. Tremor     Benign essential tremor  73-year-old male with HTN, HLD, CAD, DM 2, diabetic polyneuropathy with benign essential tremor.  I have recommended:    1.  Check copper, ceruloplasmin and TSH  2.  Start primidone 50 mg at bedtime.  He will update us with his progress and will titrate the dose of primidone depending on his response  3.  Continue metoprolol for blood pressure that likely is helping his tremors also  4.  Follow-up in 4 months    Thank you again for this referral, please feel free to contact me if you have any questions.    Stewart Gould MD  [facility]  (Formerly, [facility]     REASON FOR CONSULTATION Tremors        HISTORY OF PRESENT ILLNESS     We have been requested by Dr. Ochoa to evaluate Antonio Hidalgo who is a 73 year old  male for Tremor    Patient is a 73-year-old male with history of HTN, HLD, CAD, DM 2, diabetic polyneuropathy was referred for evaluation of bilateral hand tremors.  He was initially seen in our clinic in  when he reported intention tremors bilaterally.  He denied any family history.  Stress and anxiety would make his symptoms worse.  As his symptoms were minimal he was told to use alcohol when these tremors are too problematic.  He started drinking at least 1 glass of wine at night that was helpful but progressively his symptoms have gotten worse to a point that he has to hold onto glasses with his both hands.  He denies any head tremor or any voice tremor.  He denies any family history.  In social situations or when he is under stress his symptoms tend to get worse.  He has noticed  decline in his penmanship and mostly prints.  There is no history of any balance issues or any bradykinesia.     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    Surgical History:  He  has a past surgical history that includes joint replacement (Left); Arthroscopy shoulder rotator cuff repair (Right); and RECONSTR TOTAL SHOULDER IMPLANT (Left, 2/26/2021).     SOCIAL HISTORY     Reviewed, and he  reports that he has never smoked. He has never used smokeless tobacco. He reports that he does not currently use alcohol. He reports that he does not use drugs.     FAMILY HISTORY     Reviewed, and family history includes Myocardial Infarction in his brother and father.     ALLERGIES     Allergies   Allergen Reactions     Penicillins Swelling and Rash     Facial swelling         REVIEW OF SYSTEMS     A 12 point review of system was performed and was negative except as outlined in the history of present illness.     HOME MEDICATIONS     Current Outpatient Rx   Medication Sig Dispense Refill     acetaminophen (TYLENOL) 325 MG tablet [ACETAMINOPHEN (TYLENOL) 325 MG TABLET] Take 2 tablets (650 mg total) by mouth every 4 (four) hours as needed for pain (mild pain). 100 tablet 0     amLODIPine (NORVASC) 2.5 MG tablet [AMLODIPINE (NORVASC) 2.5 MG TABLET] Take 2.5 mg by mouth daily.       aspirin 81 MG EC tablet Take 81 mg by mouth daily       folic acid (FOLVITE) 1 MG tablet [FOLIC ACID (FOLVITE) 1 MG TABLET] Take 1 mg by mouth  "daily.       gabapentin (NEURONTIN) 100 MG capsule Take 100 mg by mouth At Bedtime       glipiZIDE (GLUCOTROL) 10 MG tablet [GLIPIZIDE (GLUCOTROL) 10 MG TABLET] Take 10 mg by mouth 2 (two) times a day before meals.       hydroCHLOROthiazide (HYDRODIURIL) 25 MG tablet [HYDROCHLOROTHIAZIDE (HYDRODIURIL) 25 MG TABLET] Take 25 mg by mouth daily.       irbesartan (AVAPRO) 300 MG tablet [IRBESARTAN (AVAPRO) 300 MG TABLET] Take 300 mg by mouth daily.       metFORMIN (GLUCOPHAGE) 500 MG tablet [METFORMIN (GLUCOPHAGE) 500 MG TABLET] Take 1,000 mg by mouth 2 (two) times a day with meals.       metoprolol succinate (TOPROL-XL) 100 MG 24 hr tablet [METOPROLOL SUCCINATE (TOPROL-XL) 100 MG 24 HR TABLET] Take 100 mg by mouth daily.       rosuvastatin (CRESTOR) 20 MG tablet [ROSUVASTATIN (CRESTOR) 20 MG TABLET] Take 20 mg by mouth at bedtime.       tamsulosin (FLOMAX) 0.4 mg cap [TAMSULOSIN (FLOMAX) 0.4 MG CAP] Take 0.4 mg by mouth Daily after breakfast.        primidone (MYSOLINE) 50 MG tablet Take 1 tablet (50 mg) by mouth At Bedtime 30 tablet 11         PHYSICAL EXAM     Vital signs  /77 (BP Location: Right arm, Patient Position: Sitting)   Pulse 66   Ht 1.727 m (5' 8\")   Wt 104.3 kg (230 lb)   BMI 34.97 kg/m      Weight:   230 lbs 0 oz    Middle-age gentleman who is alert and oriented vital signs are reviewed and documented in electronic medical record.  Neck supple.  Neurologically speech normal cranial nerves II through XII are intact motor strength 5/5 reflexes 1+ absent at ankles and knees toes downgoing.  Sensation decreased to light touch pinprick below knees.  He has bilateral intention tremor with dysmetria on finger-nose testing.  Gait normal.  Armswing normal.  No imbalance on turning.     PERTINENT DIAGNOSTIC STUDIES     Following studies were reviewed:     No recent imaging studies to review     PERTINENT LABS  Following labs were reviewed:  Lab Requisition on 06/19/2023   Component Date Value Ref Range " Status     Cholesterol 06/19/2023 116  <200 mg/dL Final     Triglycerides 06/19/2023 154 (H)  <150 mg/dL Final     Direct Measure HDL 06/19/2023 44  >=40 mg/dL Final     LDL Cholesterol Calculated 06/19/2023 41  <=100 mg/dL Final     Non HDL Cholesterol 06/19/2023 72  <130 mg/dL Final     Sodium 06/19/2023 137  136 - 145 mmol/L Final     Potassium 06/19/2023 4.1  3.4 - 5.3 mmol/L Final     Chloride 06/19/2023 98  98 - 107 mmol/L Final     Carbon Dioxide (CO2) 06/19/2023 26  22 - 29 mmol/L Final     Anion Gap 06/19/2023 13  7 - 15 mmol/L Final     Urea Nitrogen 06/19/2023 20.7  8.0 - 23.0 mg/dL Final     Creatinine 06/19/2023 0.93  0.67 - 1.17 mg/dL Final     Calcium 06/19/2023 9.5  8.8 - 10.2 mg/dL Final     Glucose 06/19/2023 163 (H)  70 - 99 mg/dL Final     Alkaline Phosphatase 06/19/2023 51  40 - 129 U/L Final     AST 06/19/2023 27  0 - 45 U/L Final     ALT 06/19/2023 36  0 - 70 U/L Final     Protein Total 06/19/2023 6.7  6.4 - 8.3 g/dL Final     Albumin 06/19/2023 4.6  3.5 - 5.2 g/dL Final     Bilirubin Total 06/19/2023 0.7  <=1.2 mg/dL Final     GFR Estimate 06/19/2023 87  >60 mL/min/1.73m2 Final     Prostate Specific Antigen Screen 06/19/2023 1.46  0.00 - 6.50 ng/mL Final        Total time spent for face to face visit, reviewing labs/imaging studies, counseling and coordination of care was: 1 Hour spent on the date of the encounter doing chart review, review of outside records, review of test results, interpretation of tests, patient visit and documentation       This note was dictated using voice recognition software.  Any grammatical or context distortions are unintentional and inherent to the software.    Orders Placed This Encounter   Procedures     Ceruloplasmin     Copper level     TSH with free T4 reflex      New Prescriptions    PRIMIDONE (MYSOLINE) 50 MG TABLET    Take 1 tablet (50 mg) by mouth At Bedtime      Modified Medications    No medications on file

## 2023-07-03 ENCOUNTER — OFFICE VISIT (OUTPATIENT)
Dept: NEUROLOGY | Facility: CLINIC | Age: 73
End: 2023-07-03
Payer: COMMERCIAL

## 2023-07-03 VITALS
SYSTOLIC BLOOD PRESSURE: 135 MMHG | HEIGHT: 68 IN | DIASTOLIC BLOOD PRESSURE: 77 MMHG | BODY MASS INDEX: 34.86 KG/M2 | WEIGHT: 230 LBS | HEART RATE: 66 BPM

## 2023-07-03 DIAGNOSIS — R74.8 ABNORMAL LEVELS OF OTHER SERUM ENZYMES: ICD-10-CM

## 2023-07-03 DIAGNOSIS — G25.0 BENIGN ESSENTIAL TREMOR: Primary | ICD-10-CM

## 2023-07-03 PROBLEM — E11.42 DIABETIC POLYNEUROPATHY ASSOCIATED WITH TYPE 2 DIABETES MELLITUS (H): Status: ACTIVE | Noted: 2023-07-03

## 2023-07-03 PROCEDURE — 99205 OFFICE O/P NEW HI 60 MIN: CPT | Performed by: PSYCHIATRY & NEUROLOGY

## 2023-07-03 RX ORDER — PRIMIDONE 50 MG/1
50 TABLET ORAL AT BEDTIME
Qty: 30 TABLET | Refills: 11 | Status: SHIPPED | OUTPATIENT
Start: 2023-07-03 | End: 2023-11-27

## 2023-07-03 RX ORDER — GABAPENTIN 100 MG/1
100 CAPSULE ORAL AT BEDTIME
COMMUNITY

## 2023-07-03 RX ORDER — ASPIRIN 81 MG/1
81 TABLET ORAL DAILY
COMMUNITY

## 2023-07-03 NOTE — LETTER
7/3/2023         RE: Antonio Hidalgo  [address]  [facility] [address]        Dear Colleague,    Thank you for referring your patient, Antonio Hidalgo, to the [facility] [place]. Please see a copy of my visit note below.    NEUROLOGY CONSULTATION NOTE       [facility] [place]  [address] [place] [address]  Tel: [phone]  Fax: [phone]  [email or web address]     Antonio Hidalgo,  1950, MRN [id number]  PCP: Blake Silva  Date: 2023     ASSESSMENT & PLAN     Visit Diagnosis  1. Tremor     Benign essential tremor  73-year-old male with HTN, HLD, CAD, DM 2, diabetic polyneuropathy with benign essential tremor.  I have recommended:    1.  Check copper, ceruloplasmin and TSH  2.  Start primidone 50 mg at bedtime.  He will update us with his progress and will titrate the dose of primidone depending on his response  3.  Continue metoprolol for blood pressure that likely is helping his tremors also  4.  Follow-up in 4 months    Thank you again for this referral, please feel free to contact me if you have any questions.    Stewart Gould MD  [facility]  (Formerly, [facility])     REASON FOR CONSULTATION Tremors        HISTORY OF PRESENT ILLNESS     We have been requested by Dr. Ochoa to evaluate Antonio Hidalgo who is a 73 year old  male for Tremor    Patient is a 73-year-old male with history of HTN, HLD, CAD, DM 2, diabetic polyneuropathy was referred for evaluation of bilateral hand tremors.  He was initially seen in our clinic in  when he reported intention tremors bilaterally.  He denied any family history.  Stress and anxiety would make his symptoms worse.  As his symptoms were minimal he was told to use alcohol when these tremors are too problematic.  He started drinking at least 1 glass of wine at night that was helpful but progressively his symptoms  have gotten worse to a point that he has to hold onto glasses with his both hands.  He denies any head tremor or any voice tremor.  He denies any family history.  In social situations or when he is under stress his symptoms tend to get worse.  He has noticed decline in his penmanship and mostly prints.  There is no history of any balance issues or any bradykinesia.     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    Surgical History:  He  has a past surgical history that includes joint replacement (Left); Arthroscopy shoulder rotator cuff repair (Right); and RECONSTR TOTAL SHOULDER IMPLANT (Left, 2/26/2021).     SOCIAL HISTORY     Reviewed, and he  reports that he has never smoked. He has never used smokeless tobacco. He reports that he does not currently use alcohol. He reports that he does not use drugs.     FAMILY HISTORY     Reviewed, and family history includes Myocardial Infarction in his brother and father.     ALLERGIES     Allergies   Allergen Reactions     Penicillins Swelling and Rash     Facial swelling         REVIEW OF SYSTEMS     A 12 point review of system was performed and was negative except as outlined in the history of present illness.     HOME MEDICATIONS     Current Outpatient Rx   Medication Sig Dispense Refill     acetaminophen (TYLENOL) 325 MG tablet [ACETAMINOPHEN (TYLENOL) 325 MG TABLET] Take 2 tablets (650 mg total) by mouth every 4 (four) hours as needed for pain (mild pain). 100 tablet 0  "    amLODIPine (NORVASC) 2.5 MG tablet [AMLODIPINE (NORVASC) 2.5 MG TABLET] Take 2.5 mg by mouth daily.       aspirin 81 MG EC tablet Take 81 mg by mouth daily       folic acid (FOLVITE) 1 MG tablet [FOLIC ACID (FOLVITE) 1 MG TABLET] Take 1 mg by mouth daily.       gabapentin (NEURONTIN) 100 MG capsule Take 100 mg by mouth At Bedtime       glipiZIDE (GLUCOTROL) 10 MG tablet [GLIPIZIDE (GLUCOTROL) 10 MG TABLET] Take 10 mg by mouth 2 (two) times a day before meals.       hydroCHLOROthiazide (HYDRODIURIL) 25 MG tablet [HYDROCHLOROTHIAZIDE (HYDRODIURIL) 25 MG TABLET] Take 25 mg by mouth daily.       irbesartan (AVAPRO) 300 MG tablet [IRBESARTAN (AVAPRO) 300 MG TABLET] Take 300 mg by mouth daily.       metFORMIN (GLUCOPHAGE) 500 MG tablet [METFORMIN (GLUCOPHAGE) 500 MG TABLET] Take 1,000 mg by mouth 2 (two) times a day with meals.       metoprolol succinate (TOPROL-XL) 100 MG 24 hr tablet [METOPROLOL SUCCINATE (TOPROL-XL) 100 MG 24 HR TABLET] Take 100 mg by mouth daily.       rosuvastatin (CRESTOR) 20 MG tablet [ROSUVASTATIN (CRESTOR) 20 MG TABLET] Take 20 mg by mouth at bedtime.       tamsulosin (FLOMAX) 0.4 mg cap [TAMSULOSIN (FLOMAX) 0.4 MG CAP] Take 0.4 mg by mouth Daily after breakfast.        primidone (MYSOLINE) 50 MG tablet Take 1 tablet (50 mg) by mouth At Bedtime 30 tablet 11         PHYSICAL EXAM     Vital signs  /77 (BP Location: Right arm, Patient Position: Sitting)   Pulse 66   Ht 1.727 m (5' 8\")   Wt 104.3 kg (230 lb)   BMI 34.97 kg/m      Weight:   230 lbs 0 oz    Middle-age gentleman who is alert and oriented vital signs are reviewed and documented in electronic medical record.  Neck supple.  Neurologically speech normal cranial nerves II through XII are intact motor strength 5/5 reflexes 1+ absent at ankles and knees toes downgoing.  Sensation decreased to light touch pinprick below knees.  He has bilateral intention tremor with dysmetria on finger-nose testing.  Gait normal.  Armswing normal. "  No imbalance on turning.     PERTINENT DIAGNOSTIC STUDIES     Following studies were reviewed:     No recent imaging studies to review     PERTINENT LABS  Following labs were reviewed:  Lab Requisition on 06/19/2023   Component Date Value Ref Range Status     Cholesterol 06/19/2023 116  <200 mg/dL Final     Triglycerides 06/19/2023 154 (H)  <150 mg/dL Final     Direct Measure HDL 06/19/2023 44  >=40 mg/dL Final     LDL Cholesterol Calculated 06/19/2023 41  <=100 mg/dL Final     Non HDL Cholesterol 06/19/2023 72  <130 mg/dL Final     Sodium 06/19/2023 137  136 - 145 mmol/L Final     Potassium 06/19/2023 4.1  3.4 - 5.3 mmol/L Final     Chloride 06/19/2023 98  98 - 107 mmol/L Final     Carbon Dioxide (CO2) 06/19/2023 26  22 - 29 mmol/L Final     Anion Gap 06/19/2023 13  7 - 15 mmol/L Final     Urea Nitrogen 06/19/2023 20.7  8.0 - 23.0 mg/dL Final     Creatinine 06/19/2023 0.93  0.67 - 1.17 mg/dL Final     Calcium 06/19/2023 9.5  8.8 - 10.2 mg/dL Final     Glucose 06/19/2023 163 (H)  70 - 99 mg/dL Final     Alkaline Phosphatase 06/19/2023 51  40 - 129 U/L Final     AST 06/19/2023 27  0 - 45 U/L Final     ALT 06/19/2023 36  0 - 70 U/L Final     Protein Total 06/19/2023 6.7  6.4 - 8.3 g/dL Final     Albumin 06/19/2023 4.6  3.5 - 5.2 g/dL Final     Bilirubin Total 06/19/2023 0.7  <=1.2 mg/dL Final     GFR Estimate 06/19/2023 87  >60 mL/min/1.73m2 Final     Prostate Specific Antigen Screen 06/19/2023 1.46  0.00 - 6.50 ng/mL Final        Total time spent for face to face visit, reviewing labs/imaging studies, counseling and coordination of care was: 1 Hour spent on the date of the encounter doing chart review, review of outside records, review of test results, interpretation of tests, patient visit and documentation       This note was dictated using voice recognition software.  Any grammatical or context distortions are unintentional and inherent to the software.    Orders Placed This Encounter   Procedures      Ceruloplasmin     Copper level     TSH with free T4 reflex      New Prescriptions    PRIMIDONE (MYSOLINE) 50 MG TABLET    Take 1 tablet (50 mg) by mouth At Bedtime      Modified Medications    No medications on file              Again, thank you for allowing me to participate in the care of your patient.        Sincerely,        Stewart Gould MD

## 2023-07-06 ENCOUNTER — TELEPHONE (OUTPATIENT)
Dept: NEUROLOGY | Facility: CLINIC | Age: 73
End: 2023-07-06
Payer: COMMERCIAL

## 2023-07-06 NOTE — TELEPHONE ENCOUNTER
Per pt, saw Dr Gould last Friday 7/3 and was going to fax over lab orders to [facility]. Went there and they haven't received anything from Dr Gould's office.    Please fax over lab orders to [facility]    Thank you

## 2023-07-12 NOTE — TELEPHONE ENCOUNTER
Called patient and LM that Alvarado NEUMANN cannot do his labs- he will need to go to [place] or [facility]. I asked her return my call if he has questions  Vandana Paez CMA on 7/12/2023 at 2:53 PM

## 2023-07-13 NOTE — TELEPHONE ENCOUNTER
[facility] Call Center    Phone Message    May a detailed message be left on voicemail: yes     Reason for Call:  Call from [phone], please do not call this number as this is not the phone number for patient any longer.     Action Taken: Other: [facility]    Travel Screening: Not Applicable

## 2023-08-20 ENCOUNTER — LAB REQUISITION (OUTPATIENT)
Dept: LAB | Facility: CLINIC | Age: 73
End: 2023-08-20

## 2023-08-20 DIAGNOSIS — R30.0 DYSURIA: ICD-10-CM

## 2023-08-20 PROCEDURE — 87086 URINE CULTURE/COLONY COUNT: CPT | Performed by: PHYSICIAN ASSISTANT

## 2023-08-21 LAB — BACTERIA UR CULT: NORMAL

## 2023-09-26 ENCOUNTER — LAB REQUISITION (OUTPATIENT)
Dept: LAB | Facility: CLINIC | Age: 73
End: 2023-09-26

## 2023-09-26 DIAGNOSIS — R82.998 OTHER ABNORMAL FINDINGS IN URINE: ICD-10-CM

## 2023-09-26 PROCEDURE — 87086 URINE CULTURE/COLONY COUNT: CPT | Performed by: STUDENT IN AN ORGANIZED HEALTH CARE EDUCATION/TRAINING PROGRAM

## 2023-09-28 LAB — BACTERIA SPEC CULT: ABNORMAL

## 2023-10-06 ENCOUNTER — LAB REQUISITION (OUTPATIENT)
Dept: LAB | Facility: CLINIC | Age: 73
End: 2023-10-06

## 2023-10-06 DIAGNOSIS — R82.998 OTHER ABNORMAL FINDINGS IN URINE: ICD-10-CM

## 2023-10-06 PROCEDURE — 87186 SC STD MICRODIL/AGAR DIL: CPT | Performed by: STUDENT IN AN ORGANIZED HEALTH CARE EDUCATION/TRAINING PROGRAM

## 2023-10-07 LAB — BACTERIA UR CULT: ABNORMAL

## 2023-10-15 ENCOUNTER — LAB REQUISITION (OUTPATIENT)
Dept: LAB | Facility: CLINIC | Age: 73
End: 2023-10-15

## 2023-10-15 DIAGNOSIS — R30.0 DYSURIA: ICD-10-CM

## 2023-10-15 PROCEDURE — 87086 URINE CULTURE/COLONY COUNT: CPT | Performed by: FAMILY MEDICINE

## 2023-10-17 ENCOUNTER — TRANSCRIBE ORDERS (OUTPATIENT)
Dept: OTHER | Age: 73
End: 2023-10-17

## 2023-10-17 DIAGNOSIS — R25.1 TREMOR OF BOTH HANDS: Primary | ICD-10-CM

## 2023-10-17 LAB — BACTERIA UR CULT: ABNORMAL

## 2023-11-25 NOTE — PROGRESS NOTES
NEUROLOGY FOLLOW UP VISIT  NOTE       [facility] NEUROLOGY [place]  [address] [place] [address]  Tel: [phone]  Fax: [phone]  [email or web address]     Antonio Hidalgo,  1950, MRN [id number]  PCP: Blake Silva  Date: 2023      ASSESSMENT & PLAN     Visit Diagnosis  Benign essential tremor     Benign essential tremor  73-year-old male with HTN, HLD, CAD, DM 2, diabetic polyneuropathy who returns for follow-up for benign essential tremor.  He has noticed improvement in his tremors with primidone.  I have recommended:    1.  Increase primidone to 100 mg at bedtime  2.  Check hepatic profile and also encouraged him to get copper, ceruloplasmin level and TSH checked that was ordered during his last visit  3.  [place] and follow-up will be in 6 months after he returns.    Thank you again for this referral, please feel free to contact me if you have any questions.    Stewart Gould MD  [facility]  (Formerly, [facility]     HISTORY OF PRESENT ILLNESS     Patient is a 73-year-old male with HTN, HLD, CAD, DM 2, diabetic polyneuropathy who was initially seen on 7/3/2023 for bilateral intention tremor and diagnosed with benign essential tremor.  I had ordered lab work that is pending.  He was started on primidone and was also told to continue on metoprolol for the time being.  He reports some improvement in his symptoms after he started taking primidone.  However he still has difficulty with his handwriting.  There is no history of any head tremor or voice tremor.    Briefly patient is a male with history of HTN, HLD, CAD, DM 2, diabetic polyneuropathy who was initially seen in our clinic in  when he reported intention tremors bilaterally.  He denied any family history.  Stress and anxiety would make his symptoms worse.  As his symptoms were minimal he was told to use alcohol  when these tremors are too problematic.  He started drinking at least 1 glass of wine at night that was helpful but progressively his symptoms have gotten worse to a point that he has to hold onto glasses with his both hands.  He denies any head tremor or any voice tremor.  He denies any family history.  In social situations or when he is under stress his symptoms tend to get worse.  He has noticed decline in his penmanship and mostly prints.  There is no history of any balance issues or any bradykinesia.  In 2023 he was started on primidone.     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    Surgical History:  He  has a past surgical history that includes joint replacement (Left); Arthroscopy shoulder rotator cuff repair (Right); and RECONSTR TOTAL SHOULDER IMPLANT (Left, 2/26/2021).     SOCIAL HISTORY     Reviewed, and he  reports that he has never smoked. He has never used smokeless tobacco. He reports that he does not currently use alcohol. He reports that he does not use drugs.     FAMILY HISTORY     Reviewed, and family history includes Myocardial Infarction in his brother and father.     ALLERGIES     Allergies   Allergen Reactions    Penicillins Swelling and Rash     Facial swelling         REVIEW OF SYSTEMS     A 12 point review of system was performed and was negative except as outlined in the history of present illness.     HOME  "MEDICATIONS     Current Outpatient Rx   Medication Sig Dispense Refill    amLODIPine (NORVASC) 2.5 MG tablet [AMLODIPINE (NORVASC) 2.5 MG TABLET] Take 2.5 mg by mouth daily.      aspirin 81 MG EC tablet Take 81 mg by mouth daily      folic acid (FOLVITE) 1 MG tablet [FOLIC ACID (FOLVITE) 1 MG TABLET] Take 1 mg by mouth daily.      gabapentin (NEURONTIN) 100 MG capsule Take 100 mg by mouth At Bedtime      glipiZIDE (GLUCOTROL) 10 MG tablet [GLIPIZIDE (GLUCOTROL) 10 MG TABLET] Take 10 mg by mouth 2 (two) times a day before meals.      hydroCHLOROthiazide (HYDRODIURIL) 25 MG tablet [HYDROCHLOROTHIAZIDE (HYDRODIURIL) 25 MG TABLET] Take 25 mg by mouth daily.      irbesartan (AVAPRO) 300 MG tablet [IRBESARTAN (AVAPRO) 300 MG TABLET] Take 300 mg by mouth daily.      metFORMIN (GLUCOPHAGE) 500 MG tablet [METFORMIN (GLUCOPHAGE) 500 MG TABLET] Take 1,000 mg by mouth 2 (two) times a day with meals.      metoprolol succinate (TOPROL-XL) 100 MG 24 hr tablet [METOPROLOL SUCCINATE (TOPROL-XL) 100 MG 24 HR TABLET] Take 100 mg by mouth daily.      primidone (MYSOLINE) 50 MG tablet TAKE 1 TABLET BY MOUTH AT BEDTIME 90 tablet 0    rosuvastatin (CRESTOR) 20 MG tablet [ROSUVASTATIN (CRESTOR) 20 MG TABLET] Take 20 mg by mouth at bedtime.      tamsulosin (FLOMAX) 0.4 mg cap [TAMSULOSIN (FLOMAX) 0.4 MG CAP] Take 0.4 mg by mouth Daily after breakfast.       acetaminophen (TYLENOL) 325 MG tablet [ACETAMINOPHEN (TYLENOL) 325 MG TABLET] Take 2 tablets (650 mg total) by mouth every 4 (four) hours as needed for pain (mild pain). 100 tablet 0         PHYSICAL EXAM     Vital signs  /78 (BP Location: Right arm, Patient Position: Sitting)   Pulse 67   Ht 1.727 m (5' 8\")   Wt 104.3 kg (230 lb)   BMI 34.97 kg/m      Weight:   230 lbs 0 oz    Middle-age gentleman who is alert and oriented vital signs are reviewed and documented in electronic medical record.  Neck supple.  Neurologically speech normal cranial nerves II through XII are intact " motor strength 5/5 reflexes 1+ absent at ankles and knees toes downgoing.  Sensation decreased to light touch pinprick below knees.  He has bilateral intention tremor with dysmetria on finger-nose testing.  Gait normal.  Armswing normal.  No imbalance on turning.      PERTINENT DIAGNOSTIC STUDIES     Following studies were reviewed:     No recent imaging studies to review     PERTINENT LABS  Following labs were reviewed:  Lab Requisition on 10/15/2023   Component Date Value Ref Range Status    Culture 10/15/2023 10,000-50,000 CFU/mL Escherichia coli (A)   Final   Lab Requisition on 10/06/2023   Component Date Value Ref Range Status    Culture 10/06/2023 >100,000 CFU/mL Escherichia coli (A)   Final   Lab Requisition on 09/26/2023   Component Date Value Ref Range Status    Culture 09/26/2023 >100,000 CFU/mL Escherichia coli (A)   Final         Total time spent for face to face visit, reviewing labs/imaging studies, counseling and coordination of care was: 30 Minutes spent on the date of the encounter doing chart review, review of outside records, review of test results, interpretation of tests, patient visit, and documentation     This note was dictated using voice recognition software.  Any grammatical or context distortions are unintentional and inherent to the software.    No orders of the defined types were placed in this encounter.     New Prescriptions    No medications on file     Modified Medications    No medications on file

## 2023-11-27 DIAGNOSIS — G25.0 BENIGN ESSENTIAL TREMOR: ICD-10-CM

## 2023-11-27 RX ORDER — PRIMIDONE 50 MG/1
50 TABLET ORAL AT BEDTIME
Qty: 90 TABLET | Refills: 0 | Status: SHIPPED | OUTPATIENT
Start: 2023-11-27 | End: 2023-12-05

## 2023-11-27 NOTE — TELEPHONE ENCOUNTER
Request for a 90 day supply vs 30 days of primidone 50mg    Sending to Dr. Kraft as Dr. Gould is out of office    Vandana Paez CMA on 11/27/2023 at 12:10 PM  [facility]

## 2023-11-29 NOTE — TELEPHONE ENCOUNTER
Called pt and LM reminding him to complete labs ordered by Dr. Gould  Patient has follow up scheduled 12/5/23  Vandana Paez CMA on 11/29/2023 at 11:09 AM  [facility]

## 2023-12-05 ENCOUNTER — OFFICE VISIT (OUTPATIENT)
Dept: NEUROLOGY | Facility: CLINIC | Age: 73
End: 2023-12-05
Payer: COMMERCIAL

## 2023-12-05 ENCOUNTER — LAB (OUTPATIENT)
Dept: LAB | Facility: HOSPITAL | Age: 73
End: 2023-12-05
Payer: COMMERCIAL

## 2023-12-05 VITALS
HEART RATE: 67 BPM | HEIGHT: 68 IN | WEIGHT: 230 LBS | BODY MASS INDEX: 34.86 KG/M2 | SYSTOLIC BLOOD PRESSURE: 130 MMHG | DIASTOLIC BLOOD PRESSURE: 78 MMHG

## 2023-12-05 DIAGNOSIS — G25.0 BENIGN ESSENTIAL TREMOR: ICD-10-CM

## 2023-12-05 DIAGNOSIS — G25.0 BENIGN ESSENTIAL TREMOR: Primary | ICD-10-CM

## 2023-12-05 DIAGNOSIS — R74.8 ABNORMAL LEVELS OF OTHER SERUM ENZYMES: ICD-10-CM

## 2023-12-05 LAB
ALBUMIN SERPL BCG-MCNC: 4.8 G/DL (ref 3.5–5.2)
ALP SERPL-CCNC: 64 U/L (ref 40–150)
ALT SERPL W P-5'-P-CCNC: 24 U/L (ref 0–70)
AST SERPL W P-5'-P-CCNC: 22 U/L (ref 0–45)
BILIRUB DIRECT SERPL-MCNC: <0.2 MG/DL (ref 0–0.3)
BILIRUB SERPL-MCNC: 0.7 MG/DL
PROT SERPL-MCNC: 7.5 G/DL (ref 6.4–8.3)
T4 FREE SERPL-MCNC: 1.15 NG/DL (ref 0.9–1.7)
TSH SERPL DL<=0.005 MIU/L-ACNC: 5.05 UIU/ML (ref 0.3–4.2)

## 2023-12-05 PROCEDURE — 84439 ASSAY OF FREE THYROXINE: CPT

## 2023-12-05 PROCEDURE — 82525 ASSAY OF COPPER: CPT

## 2023-12-05 PROCEDURE — 36415 COLL VENOUS BLD VENIPUNCTURE: CPT

## 2023-12-05 PROCEDURE — 99214 OFFICE O/P EST MOD 30 MIN: CPT | Performed by: PSYCHIATRY & NEUROLOGY

## 2023-12-05 PROCEDURE — 84443 ASSAY THYROID STIM HORMONE: CPT

## 2023-12-05 PROCEDURE — 80076 HEPATIC FUNCTION PANEL: CPT

## 2023-12-05 PROCEDURE — 82390 ASSAY OF CERULOPLASMIN: CPT

## 2023-12-05 RX ORDER — PRIMIDONE 50 MG/1
100 TABLET ORAL AT BEDTIME
Qty: 60 TABLET | Refills: 11 | Status: SHIPPED | OUTPATIENT
Start: 2023-12-05 | End: 2024-06-05

## 2023-12-05 NOTE — LETTER
2023         RE: Antonio Hidalgo  [address]  [facility] [address]        Dear Colleague,    Thank you for referring your patient, Antonio Hidalgo, to the [facility]UR[facility] [place]. Please see a copy of my visit note below.    NEUROLOGY FOLLOW UP VISIT  NOTE       [facility] NEUROLOGY [place]  [address] [place] [address]  Tel: [phone]  Fax: [phone]  [email or web address]     Antonio Hidalgo,  1950, MRN [id number]  PCP: Blake Silva  Date: 2023      ASSESSMENT & PLAN     Visit Diagnosis  Benign essential tremor     Benign essential tremor  73-year-old male with HTN, HLD, CAD, DM 2, diabetic polyneuropathy who returns for follow-up for benign essential tremor.  He has noticed improvement in his tremors with primidone.  I have recommended:    1.  Increase primidone to 100 mg at bedtime  2.  Check hepatic profile and also encouraged him to get copper, ceruloplasmin level and TSH checked that was ordered during his last visit  3.  [place] and follow-up will be in 6 months after he returns.    Thank you again for this referral, please feel free to contact me if you have any questions.    Stewart Gould MD  [facility] NEUROLOGY, [place]  (Formerly, [facility]     HISTORY OF PRESENT ILLNESS     Patient is a 73-year-old male with HTN, HLD, CAD, DM 2, diabetic polyneuropathy who was initially seen on 7/3/2023 for bilateral intention tremor and diagnosed with benign essential tremor.  I had ordered lab work that is pending.  He was started on primidone and was also told to continue on metoprolol for the time being.  He reports some improvement in his symptoms after he started taking primidone.  However he still has difficulty with his handwriting.  There is no history of any head tremor or voice tremor.    Briefly patient is a male with history of HTN, HLD, CAD, DM  2, diabetic polyneuropathy who was initially seen in our clinic in 2015 when he reported intention tremors bilaterally.  He denied any family history.  Stress and anxiety would make his symptoms worse.  As his symptoms were minimal he was told to use alcohol when these tremors are too problematic.  He started drinking at least 1 glass of wine at night that was helpful but progressively his symptoms have gotten worse to a point that he has to hold onto glasses with his both hands.  He denies any head tremor or any voice tremor.  He denies any family history.  In social situations or when he is under stress his symptoms tend to get worse.  He has noticed decline in his penmanship and mostly prints.  There is no history of any balance issues or any bradykinesia.  In 2023 he was started on primidone.     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    Surgical History:  He  has a past surgical history that includes joint replacement (Left); Arthroscopy shoulder rotator cuff repair (Right); and RECONSTR TOTAL SHOULDER IMPLANT (Left, 2/26/2021).     SOCIAL HISTORY     Reviewed, and he  reports that he has never smoked. He has never used smokeless tobacco. He reports that he does not currently use alcohol. He reports that he does not use drugs.     FAMILY HISTORY     Reviewed, and family history includes Myocardial Infarction in his brother and  "father.     ALLERGIES     Allergies   Allergen Reactions     Penicillins Swelling and Rash     Facial swelling         REVIEW OF SYSTEMS     A 12 point review of system was performed and was negative except as outlined in the history of present illness.     HOME MEDICATIONS     Current Outpatient Rx   Medication Sig Dispense Refill     amLODIPine (NORVASC) 2.5 MG tablet [AMLODIPINE (NORVASC) 2.5 MG TABLET] Take 2.5 mg by mouth daily.       aspirin 81 MG EC tablet Take 81 mg by mouth daily       folic acid (FOLVITE) 1 MG tablet [FOLIC ACID (FOLVITE) 1 MG TABLET] Take 1 mg by mouth daily.       gabapentin (NEURONTIN) 100 MG capsule Take 100 mg by mouth At Bedtime       glipiZIDE (GLUCOTROL) 10 MG tablet [GLIPIZIDE (GLUCOTROL) 10 MG TABLET] Take 10 mg by mouth 2 (two) times a day before meals.       hydroCHLOROthiazide (HYDRODIURIL) 25 MG tablet [HYDROCHLOROTHIAZIDE (HYDRODIURIL) 25 MG TABLET] Take 25 mg by mouth daily.       irbesartan (AVAPRO) 300 MG tablet [IRBESARTAN (AVAPRO) 300 MG TABLET] Take 300 mg by mouth daily.       metFORMIN (GLUCOPHAGE) 500 MG tablet [METFORMIN (GLUCOPHAGE) 500 MG TABLET] Take 1,000 mg by mouth 2 (two) times a day with meals.       metoprolol succinate (TOPROL-XL) 100 MG 24 hr tablet [METOPROLOL SUCCINATE (TOPROL-XL) 100 MG 24 HR TABLET] Take 100 mg by mouth daily.       primidone (MYSOLINE) 50 MG tablet TAKE 1 TABLET BY MOUTH AT BEDTIME 90 tablet 0     rosuvastatin (CRESTOR) 20 MG tablet [ROSUVASTATIN (CRESTOR) 20 MG TABLET] Take 20 mg by mouth at bedtime.       tamsulosin (FLOMAX) 0.4 mg cap [TAMSULOSIN (FLOMAX) 0.4 MG CAP] Take 0.4 mg by mouth Daily after breakfast.        acetaminophen (TYLENOL) 325 MG tablet [ACETAMINOPHEN (TYLENOL) 325 MG TABLET] Take 2 tablets (650 mg total) by mouth every 4 (four) hours as needed for pain (mild pain). 100 tablet 0         PHYSICAL EXAM     Vital signs  /78 (BP Location: Right arm, Patient Position: Sitting)   Pulse 67   Ht 1.727 m (5' 8\")  "  Wt 104.3 kg (230 lb)   BMI 34.97 kg/m      Weight:   230 lbs 0 oz    Middle-age gentleman who is alert and oriented vital signs are reviewed and documented in electronic medical record.  Neck supple.  Neurologically speech normal cranial nerves II through XII are intact motor strength 5/5 reflexes 1+ absent at ankles and knees toes downgoing.  Sensation decreased to light touch pinprick below knees.  He has bilateral intention tremor with dysmetria on finger-nose testing.  Gait normal.  Armswing normal.  No imbalance on turning.      PERTINENT DIAGNOSTIC STUDIES     Following studies were reviewed:     No recent imaging studies to review     PERTINENT LABS  Following labs were reviewed:  Lab Requisition on 10/15/2023   Component Date Value Ref Range Status     Culture 10/15/2023 10,000-50,000 CFU/mL Escherichia coli (A)   Final   Lab Requisition on 10/06/2023   Component Date Value Ref Range Status     Culture 10/06/2023 >100,000 CFU/mL Escherichia coli (A)   Final   Lab Requisition on 09/26/2023   Component Date Value Ref Range Status     Culture 09/26/2023 >100,000 CFU/mL Escherichia coli (A)   Final         Total time spent for face to face visit, reviewing labs/imaging studies, counseling and coordination of care was: 30 Minutes spent on the date of the encounter doing chart review, review of outside records, review of test results, interpretation of tests, patient visit, and documentation     This note was dictated using voice recognition software.  Any grammatical or context distortions are unintentional and inherent to the software.    No orders of the defined types were placed in this encounter.     New Prescriptions    No medications on file     Modified Medications    No medications on file                 Again, thank you for allowing me to participate in the care of your patient.        Sincerely,        Stewart Gould MD

## 2023-12-05 NOTE — NURSING NOTE
Chief Complaint   Patient presents with    Tremors     Follow up- patient did not complete lab work as [facility] was unable to do them. He will go to [place] today. Patient reports Primidone has helped with his tremor    Numbness     Patient reports intermittent episodes of waking up with BUE numbness      Vandana Paez CMA on 12/5/2023 at 11:16 AM  [facility]

## 2023-12-07 LAB
CERULOPLASMIN SERPL-MCNC: 19 MG/DL (ref 20–60)
COPPER SERPL-MCNC: 90.9 UG/DL

## 2024-04-29 ENCOUNTER — LAB REQUISITION (OUTPATIENT)
Dept: LAB | Facility: CLINIC | Age: 74
End: 2024-04-29

## 2024-04-29 DIAGNOSIS — E78.5 HYPERLIPIDEMIA, UNSPECIFIED: ICD-10-CM

## 2024-04-29 LAB
ALBUMIN SERPL BCG-MCNC: 4.7 G/DL (ref 3.5–5.2)
ALP SERPL-CCNC: 52 U/L (ref 40–150)
ALT SERPL W P-5'-P-CCNC: 34 U/L (ref 0–70)
ANION GAP SERPL CALCULATED.3IONS-SCNC: 13 MMOL/L (ref 7–15)
AST SERPL W P-5'-P-CCNC: 25 U/L (ref 0–45)
BILIRUB SERPL-MCNC: 0.6 MG/DL
BUN SERPL-MCNC: 17.8 MG/DL (ref 8–23)
CALCIUM SERPL-MCNC: 9.8 MG/DL (ref 8.8–10.2)
CHLORIDE SERPL-SCNC: 97 MMOL/L (ref 98–107)
CHOLEST SERPL-MCNC: 125 MG/DL
CREAT SERPL-MCNC: 0.92 MG/DL (ref 0.67–1.17)
DEPRECATED HCO3 PLAS-SCNC: 29 MMOL/L (ref 22–29)
EGFRCR SERPLBLD CKD-EPI 2021: 88 ML/MIN/1.73M2
FASTING STATUS PATIENT QL REPORTED: ABNORMAL
GLUCOSE SERPL-MCNC: 134 MG/DL (ref 70–99)
HDLC SERPL-MCNC: 48 MG/DL
LDLC SERPL CALC-MCNC: 44 MG/DL
NONHDLC SERPL-MCNC: 77 MG/DL
POTASSIUM SERPL-SCNC: 4 MMOL/L (ref 3.4–5.3)
PROT SERPL-MCNC: 7.1 G/DL (ref 6.4–8.3)
SODIUM SERPL-SCNC: 139 MMOL/L (ref 135–145)
TRIGL SERPL-MCNC: 165 MG/DL

## 2024-04-29 PROCEDURE — 80061 LIPID PANEL: CPT | Performed by: STUDENT IN AN ORGANIZED HEALTH CARE EDUCATION/TRAINING PROGRAM

## 2024-04-29 PROCEDURE — 80053 COMPREHEN METABOLIC PANEL: CPT | Performed by: STUDENT IN AN ORGANIZED HEALTH CARE EDUCATION/TRAINING PROGRAM

## 2024-05-03 ENCOUNTER — LAB REQUISITION (OUTPATIENT)
Dept: LAB | Facility: CLINIC | Age: 74
End: 2024-05-03

## 2024-05-03 DIAGNOSIS — E11.59 TYPE 2 DIABETES MELLITUS WITH OTHER CIRCULATORY COMPLICATIONS (H): ICD-10-CM

## 2024-05-03 PROCEDURE — 82043 UR ALBUMIN QUANTITATIVE: CPT | Performed by: STUDENT IN AN ORGANIZED HEALTH CARE EDUCATION/TRAINING PROGRAM

## 2024-05-04 LAB
CREAT UR-MCNC: 104 MG/DL
MICROALBUMIN UR-MCNC: <12 MG/L
MICROALBUMIN/CREAT UR: NORMAL MG/G{CREAT}

## 2024-06-02 NOTE — PROGRESS NOTES
NEUROLOGY FOLLOW UP VISIT  NOTE       [facility] NEUROLOGY [place]  [address] [place] [address]  Tel: [phone]  Fax: [phone]  [email or web address]     Antonio Hidalgo,  1950, MRN [id number]  PCP: Blake Silva  Date: 2024      ASSESSMENT & PLAN     Visit Diagnosis  Benign essential tremor     Benign essential tremor  Pleasant 74-year-old gentleman with history of HTN, HLD, CAD, DM 2, diabetic polyneuropathy who returns for follow-up for benign essential tremor.  His tremors are well-controlled on current dose of primidone.  I have recommended:    1.  Continue primidone 100 mg at bedtime.  Prescriptions were filled.  I gave him 90-day supply with 3 refills  2.  Check hepatic panel  3.  Follow-up in 1 year.    Thank you again for this referral, please feel free to contact me if you have any questions.    Stewart Gould MD  [facility] NEUROLOGY, [place]     HISTORY OF PRESENT ILLNESS     Patient is a 74-year-old male with history of HTN, HLD, CAD, DM 2, diabetic polyneuropathy last seen on 2023 for benign essential tremor who returns for follow-up.  During his last visit he had reported improvement in his symptoms with primidone and as he was having some tremors specially with fatigue dose of primidone was increased to 100 mg at bedtime.  He also had lab work that included normal copper with a borderline ceruloplasmin.  TSH was elevated 5.05 but free T4 was normal and also hepatic panel was normal.  He feels his tremors are well-controlled and only during period of stress his tremors getting to accentuate he does not think he is ready to increase the dose.    BRIEFLY patient is a male with history of HTN, HLD, CAD, DM 2, diabetic polyneuropathy who was initially seen in our clinic in  when he reported intention tremors bilaterally.  He denied any family history.  Stress and anxiety would makes his symptoms worse.  As his symptoms were  minimal he was told to use alcohol when these tremors are too problematic.  He started drinking at least 1 glass of wine at night that was helpful but progressively his symptoms got worse to a point that he had to hold onto glasses with his both hands.  He denied any head tremor or any voice tremor.  He denied any family history.  In social situations or when he is under stress his symptoms tend to get worse.  He also noticed decline in his penmanship and mostly prints.  There is no history of any balance issues or any bradykinesia.  In 2023 he was started on primidone due to worsening symptoms with improvement.     PROBLEM LIST   Patient Active Problem List   Diagnosis    S/P reverse total shoulder arthroplasty, left    DM2 (diabetes mellitus, type 2) (H)    Dyslipidemia    Essential hypertension    Atherosclerosis of native coronary artery of native heart without angina pectoris    Benign essential tremor    Diabetic polyneuropathy associated with type 2 diabetes mellitus (H)         PAST MEDICAL & SURGICAL HISTORY     Past Medical History:   Patient  has a past medical history of Coronary artery disease, Diabetes mellitus (H), History of blood clots, Hypertension, and Neuropathy.    Surgical History:  He  has a past surgical history that includes joint replacement (Left); Arthroscopy shoulder rotator cuff repair (Right); and RECONSTR TOTAL SHOULDER IMPLANT (Left, 2/26/2021).     SOCIAL HISTORY     Reviewed, and he  reports that he has never smoked. He has never used smokeless tobacco. He reports that he does not currently use alcohol. He reports that he does not use drugs.     FAMILY HISTORY     Reviewed, and family history includes Myocardial Infarction in his brother and father.     ALLERGIES     Allergies   Allergen Reactions    Penicillins Swelling and Rash     Facial swelling         REVIEW OF SYSTEMS     A 12 point review of system was performed and was negative except as outlined in the history of present  "illness.     HOME MEDICATIONS     Current Outpatient Rx   Medication Sig Dispense Refill    acetaminophen (TYLENOL) 325 MG tablet [ACETAMINOPHEN (TYLENOL) 325 MG TABLET] Take 2 tablets (650 mg total) by mouth every 4 (four) hours as needed for pain (mild pain). 100 tablet 0    amLODIPine (NORVASC) 2.5 MG tablet [AMLODIPINE (NORVASC) 2.5 MG TABLET] Take 2.5 mg by mouth daily.      aspirin 81 MG EC tablet Take 81 mg by mouth daily      folic acid (FOLVITE) 1 MG tablet [FOLIC ACID (FOLVITE) 1 MG TABLET] Take 1 mg by mouth daily.      gabapentin (NEURONTIN) 100 MG capsule Take 100 mg by mouth At Bedtime      glipiZIDE (GLUCOTROL) 10 MG tablet [GLIPIZIDE (GLUCOTROL) 10 MG TABLET] Take 10 mg by mouth 2 (two) times a day before meals.      hydroCHLOROthiazide (HYDRODIURIL) 25 MG tablet [HYDROCHLOROTHIAZIDE (HYDRODIURIL) 25 MG TABLET] Take 25 mg by mouth daily.      irbesartan (AVAPRO) 300 MG tablet [IRBESARTAN (AVAPRO) 300 MG TABLET] Take 300 mg by mouth daily.      metFORMIN (GLUCOPHAGE) 500 MG tablet [METFORMIN (GLUCOPHAGE) 500 MG TABLET] Take 1,000 mg by mouth 2 (two) times a day with meals.      metoprolol succinate (TOPROL-XL) 100 MG 24 hr tablet [METOPROLOL SUCCINATE (TOPROL-XL) 100 MG 24 HR TABLET] Take 100 mg by mouth daily.      primidone (MYSOLINE) 50 MG tablet Take 2 tablets (100 mg) by mouth at bedtime 180 tablet 3    rosuvastatin (CRESTOR) 20 MG tablet [ROSUVASTATIN (CRESTOR) 20 MG TABLET] Take 20 mg by mouth at bedtime.      tamsulosin (FLOMAX) 0.4 mg cap [TAMSULOSIN (FLOMAX) 0.4 MG CAP] Take 0.4 mg by mouth Daily after breakfast.            PHYSICAL EXAM     Vital signs  BP (!) 143/77 (BP Location: Left arm, Patient Position: Sitting)   Pulse 69   Ht 1.727 m (5' 8\")   Wt 104.3 kg (230 lb)   BMI 34.97 kg/m      Weight:   230 lbs 0 oz    Middle-age gentleman who is alert and oriented vital signs are reviewed and documented in electronic medical record.  Neck supple.  Neurologically speech normal cranial " nerves II through XII are intact motor strength 5/5 reflexes 1+ absent at ankles and knees toes downgoing.  Sensation decreased to light touch pinprick below knees.  He has bilateral intention tremor with dysmetria on finger-nose testing.  Gait normal.  Armswing normal.  No imbalance on turning.      PERTINENT DIAGNOSTIC STUDIES     Following studies were reviewed:     No recent imaging studies to review     PERTINENT LABS  Following labs were reviewed:  Lab Requisition on 05/03/2024   Component Date Value Ref Range Status    Creatinine Urine mg/dL 05/03/2024 104.0  mg/dL Final    Albumin Urine mg/L 05/03/2024 <12.0  mg/L Final    Albumin Urine mg/g Cr 05/03/2024    Final   Lab Requisition on 04/29/2024   Component Date Value Ref Range Status    Cholesterol 04/29/2024 125  <200 mg/dL Final    Triglycerides 04/29/2024 165 (H)  <150 mg/dL Final    Direct Measure HDL 04/29/2024 48  >=40 mg/dL Final    LDL Cholesterol Calculated 04/29/2024 44  <=100 mg/dL Final    Non HDL Cholesterol 04/29/2024 77  <130 mg/dL Final    Patient Fasting > 8hrs? 04/29/2024 Unknown   Final    Sodium 04/29/2024 139  135 - 145 mmol/L Final    Potassium 04/29/2024 4.0  3.4 - 5.3 mmol/L Final    Carbon Dioxide (CO2) 04/29/2024 29  22 - 29 mmol/L Final    Anion Gap 04/29/2024 13  7 - 15 mmol/L Final    Urea Nitrogen 04/29/2024 17.8  8.0 - 23.0 mg/dL Final    Creatinine 04/29/2024 0.92  0.67 - 1.17 mg/dL Final    GFR Estimate 04/29/2024 88  >60 mL/min/1.73m2 Final    Calcium 04/29/2024 9.8  8.8 - 10.2 mg/dL Final    Chloride 04/29/2024 97 (L)  98 - 107 mmol/L Final    Glucose 04/29/2024 134 (H)  70 - 99 mg/dL Final    Alkaline Phosphatase 04/29/2024 52  40 - 150 U/L Final    AST 04/29/2024 25  0 - 45 U/L Final    ALT 04/29/2024 34  0 - 70 U/L Final    Protein Total 04/29/2024 7.1  6.4 - 8.3 g/dL Final    Albumin 04/29/2024 4.7  3.5 - 5.2 g/dL Final    Bilirubin Total 04/29/2024 0.6  <=1.2 mg/dL Final         Total time spent for face to face  visit, reviewing labs/imaging studies, counseling and coordination of care was: 30 Minutes spent on the date of the encounter doing chart review, review of outside records, review of test results, interpretation of tests, patient visit, and documentation     The longitudinal plan of care for the diagnosis(es)/condition(s) as documented were addressed during this visit. Due to the added complexity in care, I will continue to support Antonio in the subsequent management and with ongoing continuity of care.    This note was dictated using voice recognition software.  Any grammatical or context distortions are unintentional and inherent to the software.    Orders Placed This Encounter   Procedures    Hepatic function panel      New Prescriptions    No medications on file     Modified Medications    Modified Medication Previous Medication    PRIMIDONE (MYSOLINE) 50 MG TABLET primidone (MYSOLINE) 50 MG tablet       Take 2 tablets (100 mg) by mouth at bedtime    Take 2 tablets (100 mg) by mouth at bedtime

## 2024-06-05 ENCOUNTER — OFFICE VISIT (OUTPATIENT)
Dept: NEUROLOGY | Facility: CLINIC | Age: 74
End: 2024-06-05
Payer: COMMERCIAL

## 2024-06-05 ENCOUNTER — LAB (OUTPATIENT)
Dept: LAB | Facility: HOSPITAL | Age: 74
End: 2024-06-05
Payer: COMMERCIAL

## 2024-06-05 VITALS
WEIGHT: 230 LBS | HEIGHT: 68 IN | SYSTOLIC BLOOD PRESSURE: 143 MMHG | HEART RATE: 69 BPM | DIASTOLIC BLOOD PRESSURE: 77 MMHG | BODY MASS INDEX: 34.86 KG/M2

## 2024-06-05 DIAGNOSIS — G25.0 BENIGN ESSENTIAL TREMOR: Primary | ICD-10-CM

## 2024-06-05 DIAGNOSIS — G25.0 BENIGN ESSENTIAL TREMOR: ICD-10-CM

## 2024-06-05 LAB
ALBUMIN SERPL BCG-MCNC: 4.4 G/DL (ref 3.5–5.2)
ALP SERPL-CCNC: 58 U/L (ref 40–150)
ALT SERPL W P-5'-P-CCNC: 30 U/L (ref 0–70)
AST SERPL W P-5'-P-CCNC: 20 U/L (ref 0–45)
BILIRUB DIRECT SERPL-MCNC: <0.2 MG/DL (ref 0–0.3)
BILIRUB SERPL-MCNC: 0.7 MG/DL
PROT SERPL-MCNC: 7.1 G/DL (ref 6.4–8.3)

## 2024-06-05 PROCEDURE — 99214 OFFICE O/P EST MOD 30 MIN: CPT | Performed by: PSYCHIATRY & NEUROLOGY

## 2024-06-05 PROCEDURE — 80076 HEPATIC FUNCTION PANEL: CPT

## 2024-06-05 PROCEDURE — 36415 COLL VENOUS BLD VENIPUNCTURE: CPT

## 2024-06-05 PROCEDURE — G2211 COMPLEX E/M VISIT ADD ON: HCPCS | Performed by: PSYCHIATRY & NEUROLOGY

## 2024-06-05 RX ORDER — PRIMIDONE 50 MG/1
100 TABLET ORAL AT BEDTIME
Qty: 180 TABLET | Refills: 3 | Status: SHIPPED | OUTPATIENT
Start: 2024-06-05

## 2024-06-05 NOTE — NURSING NOTE
Chief Complaint   Patient presents with    Tremors     6 month follow- patient reports tremors have slightly improved      Vandana Paez CMA on 6/5/2024 at 9:38 AM  [facility]

## 2024-06-05 NOTE — LETTER
2024      Antonio Hidalgo  [address]  [place] [address]      Dear Colleague,    Thank you for referring your patient, Antonio Hidalgo, to the [facility] NEUROLOGY CLINIC [place]. Please see a copy of my visit note below.    NEUROLOGY FOLLOW UP VISIT  NOTE       [facility] NEUROLOGY [place]  [address] [place] [address]  Tel: [phone]  Fax: [phone]  [email or web address]     Antonio Hidalgo,  1950, MRN [id number]  PCP: Blake Silva  Date: 2024      ASSESSMENT & PLAN     Visit Diagnosis  Benign essential tremor     Benign essential tremor  Pleasant 74-year-old gentleman with history of HTN, HLD, CAD, DM 2, diabetic polyneuropathy who returns for follow-up for benign essential tremor.  His tremors are well-controlled on current dose of primidone.  I have recommended:    1.  Continue primidone 100 mg at bedtime.  Prescriptions were filled.  I gave him 90-day supply with 3 refills  2.  Check hepatic panel  3.  Follow-up in 1 year.    Thank you again for this referral, please feel free to contact me if you have any questions.    Stewart Gould MD  [facility] NEUROLOGY, [place]     HISTORY OF PRESENT ILLNESS     Patient is a 74-year-old male with history of HTN, HLD, CAD, DM 2, diabetic polyneuropathy last seen on 2023 for benign essential tremor who returns for follow-up.  During his last visit he had reported improvement in his symptoms with primidone and as he was having some tremors specially with fatigue dose of primidone was increased to 100 mg at bedtime.  He also had lab work that included normal copper with a borderline ceruloplasmin.  TSH was elevated 5.05 but free T4 was normal and also hepatic panel was normal.  He feels his tremors are well-controlled and only during period of stress his tremors getting to accentuate he does not think he is ready to increase the dose.    BRIEFLY patient is a male with history of  HTN, HLD, CAD, DM 2, diabetic polyneuropathy who was initially seen in our clinic in 2015 when he reported intention tremors bilaterally.  He denied any family history.  Stress and anxiety would makes his symptoms worse.  As his symptoms were minimal he was told to use alcohol when these tremors are too problematic.  He started drinking at least 1 glass of wine at night that was helpful but progressively his symptoms got worse to a point that he had to hold onto glasses with his both hands.  He denied any head tremor or any voice tremor.  He denied any family history.  In social situations or when he is under stress his symptoms tend to get worse.  He also noticed decline in his penmanship and mostly prints.  There is no history of any balance issues or any bradykinesia.  In 2023 he was started on primidone due to worsening symptoms with improvement.     PROBLEM LIST   Patient Active Problem List   Diagnosis     S/P reverse total shoulder arthroplasty, left     DM2 (diabetes mellitus, type 2) (H)     Dyslipidemia     Essential hypertension     Atherosclerosis of native coronary artery of native heart without angina pectoris     Benign essential tremor     Diabetic polyneuropathy associated with type 2 diabetes mellitus (H)         PAST MEDICAL & SURGICAL HISTORY     Past Medical History:   Patient  has a past medical history of Coronary artery disease, Diabetes mellitus (H), History of blood clots, Hypertension, and Neuropathy.    Surgical History:  He  has a past surgical history that includes joint replacement (Left); Arthroscopy shoulder rotator cuff repair (Right); and RECONSTR TOTAL SHOULDER IMPLANT (Left, 2/26/2021).     SOCIAL HISTORY     Reviewed, and he  reports that he has never smoked. He has never used smokeless tobacco. He reports that he does not currently use alcohol. He reports that he does not use drugs.     FAMILY HISTORY     Reviewed, and family history includes Myocardial Infarction in his  brother and father.     ALLERGIES     Allergies   Allergen Reactions     Penicillins Swelling and Rash     Facial swelling         REVIEW OF SYSTEMS     A 12 point review of system was performed and was negative except as outlined in the history of present illness.     HOME MEDICATIONS     Current Outpatient Rx   Medication Sig Dispense Refill     acetaminophen (TYLENOL) 325 MG tablet [ACETAMINOPHEN (TYLENOL) 325 MG TABLET] Take 2 tablets (650 mg total) by mouth every 4 (four) hours as needed for pain (mild pain). 100 tablet 0     amLODIPine (NORVASC) 2.5 MG tablet [AMLODIPINE (NORVASC) 2.5 MG TABLET] Take 2.5 mg by mouth daily.       aspirin 81 MG EC tablet Take 81 mg by mouth daily       folic acid (FOLVITE) 1 MG tablet [FOLIC ACID (FOLVITE) 1 MG TABLET] Take 1 mg by mouth daily.       gabapentin (NEURONTIN) 100 MG capsule Take 100 mg by mouth At Bedtime       glipiZIDE (GLUCOTROL) 10 MG tablet [GLIPIZIDE (GLUCOTROL) 10 MG TABLET] Take 10 mg by mouth 2 (two) times a day before meals.       hydroCHLOROthiazide (HYDRODIURIL) 25 MG tablet [HYDROCHLOROTHIAZIDE (HYDRODIURIL) 25 MG TABLET] Take 25 mg by mouth daily.       irbesartan (AVAPRO) 300 MG tablet [IRBESARTAN (AVAPRO) 300 MG TABLET] Take 300 mg by mouth daily.       metFORMIN (GLUCOPHAGE) 500 MG tablet [METFORMIN (GLUCOPHAGE) 500 MG TABLET] Take 1,000 mg by mouth 2 (two) times a day with meals.       metoprolol succinate (TOPROL-XL) 100 MG 24 hr tablet [METOPROLOL SUCCINATE (TOPROL-XL) 100 MG 24 HR TABLET] Take 100 mg by mouth daily.       primidone (MYSOLINE) 50 MG tablet Take 2 tablets (100 mg) by mouth at bedtime 180 tablet 3     rosuvastatin (CRESTOR) 20 MG tablet [ROSUVASTATIN (CRESTOR) 20 MG TABLET] Take 20 mg by mouth at bedtime.       tamsulosin (FLOMAX) 0.4 mg cap [TAMSULOSIN (FLOMAX) 0.4 MG CAP] Take 0.4 mg by mouth Daily after breakfast.            PHYSICAL EXAM     Vital signs  BP (!) 143/77 (BP Location: Left arm, Patient Position: Sitting)   Pulse  "69   Ht 1.727 m (5' 8\")   Wt 104.3 kg (230 lb)   BMI 34.97 kg/m      Weight:   230 lbs 0 oz    Middle-age gentleman who is alert and oriented vital signs are reviewed and documented in electronic medical record.  Neck supple.  Neurologically speech normal cranial nerves II through XII are intact motor strength 5/5 reflexes 1+ absent at ankles and knees toes downgoing.  Sensation decreased to light touch pinprick below knees.  He has bilateral intention tremor with dysmetria on finger-nose testing.  Gait normal.  Armswing normal.  No imbalance on turning.      PERTINENT DIAGNOSTIC STUDIES     Following studies were reviewed:     No recent imaging studies to review     PERTINENT LABS  Following labs were reviewed:  Lab Requisition on 05/03/2024   Component Date Value Ref Range Status     Creatinine Urine mg/dL 05/03/2024 104.0  mg/dL Final     Albumin Urine mg/L 05/03/2024 <12.0  mg/L Final     Albumin Urine mg/g Cr 05/03/2024    Final   Lab Requisition on 04/29/2024   Component Date Value Ref Range Status     Cholesterol 04/29/2024 125  <200 mg/dL Final     Triglycerides 04/29/2024 165 (H)  <150 mg/dL Final     Direct Measure HDL 04/29/2024 48  >=40 mg/dL Final     LDL Cholesterol Calculated 04/29/2024 44  <=100 mg/dL Final     Non HDL Cholesterol 04/29/2024 77  <130 mg/dL Final     Patient Fasting > 8hrs? 04/29/2024 Unknown   Final     Sodium 04/29/2024 139  135 - 145 mmol/L Final     Potassium 04/29/2024 4.0  3.4 - 5.3 mmol/L Final     Carbon Dioxide (CO2) 04/29/2024 29  22 - 29 mmol/L Final     Anion Gap 04/29/2024 13  7 - 15 mmol/L Final     Urea Nitrogen 04/29/2024 17.8  8.0 - 23.0 mg/dL Final     Creatinine 04/29/2024 0.92  0.67 - 1.17 mg/dL Final     GFR Estimate 04/29/2024 88  >60 mL/min/1.73m2 Final     Calcium 04/29/2024 9.8  8.8 - 10.2 mg/dL Final     Chloride 04/29/2024 97 (L)  98 - 107 mmol/L Final     Glucose 04/29/2024 134 (H)  70 - 99 mg/dL Final     Alkaline Phosphatase 04/29/2024 52  40 - 150 U/L " Final     AST 04/29/2024 25  0 - 45 U/L Final     ALT 04/29/2024 34  0 - 70 U/L Final     Protein Total 04/29/2024 7.1  6.4 - 8.3 g/dL Final     Albumin 04/29/2024 4.7  3.5 - 5.2 g/dL Final     Bilirubin Total 04/29/2024 0.6  <=1.2 mg/dL Final         Total time spent for face to face visit, reviewing labs/imaging studies, counseling and coordination of care was: 30 Minutes spent on the date of the encounter doing chart review, review of outside records, review of test results, interpretation of tests, patient visit, and documentation     The longitudinal plan of care for the diagnosis(es)/condition(s) as documented were addressed during this visit. Due to the added complexity in care, I will continue to support Antonio in the subsequent management and with ongoing continuity of care.    This note was dictated using voice recognition software.  Any grammatical or context distortions are unintentional and inherent to the software.    Orders Placed This Encounter   Procedures     Hepatic function panel      New Prescriptions    No medications on file     Modified Medications    Modified Medication Previous Medication    PRIMIDONE (MYSOLINE) 50 MG TABLET primidone (MYSOLINE) 50 MG tablet       Take 2 tablets (100 mg) by mouth at bedtime    Take 2 tablets (100 mg) by mouth at bedtime                 Again, thank you for allowing me to participate in the care of your patient.        Sincerely,        Stewart Gould MD

## 2024-10-21 ENCOUNTER — LAB REQUISITION (OUTPATIENT)
Dept: LAB | Facility: CLINIC | Age: 74
End: 2024-10-21

## 2024-10-21 DIAGNOSIS — I25.10 ATHEROSCLEROTIC HEART DISEASE OF NATIVE CORONARY ARTERY WITHOUT ANGINA PECTORIS: ICD-10-CM

## 2024-10-21 DIAGNOSIS — E11.59 TYPE 2 DIABETES MELLITUS WITH OTHER CIRCULATORY COMPLICATIONS (H): ICD-10-CM

## 2024-10-21 LAB
ALBUMIN SERPL BCG-MCNC: 4.5 G/DL (ref 3.5–5.2)
ALP SERPL-CCNC: 50 U/L (ref 40–150)
ALT SERPL W P-5'-P-CCNC: 35 U/L (ref 0–70)
ANION GAP SERPL CALCULATED.3IONS-SCNC: 12 MMOL/L (ref 7–15)
AST SERPL W P-5'-P-CCNC: 23 U/L (ref 0–45)
BILIRUB SERPL-MCNC: 0.6 MG/DL
BUN SERPL-MCNC: 19.1 MG/DL (ref 8–23)
CALCIUM SERPL-MCNC: 9.5 MG/DL (ref 8.8–10.4)
CHLORIDE SERPL-SCNC: 95 MMOL/L (ref 98–107)
CHOLEST SERPL-MCNC: 123 MG/DL
CREAT SERPL-MCNC: 0.95 MG/DL (ref 0.67–1.17)
CREAT UR-MCNC: 106 MG/DL
EGFRCR SERPLBLD CKD-EPI 2021: 84 ML/MIN/1.73M2
FASTING STATUS PATIENT QL REPORTED: ABNORMAL
FASTING STATUS PATIENT QL REPORTED: ABNORMAL
GLUCOSE SERPL-MCNC: 204 MG/DL (ref 70–99)
HCO3 SERPL-SCNC: 28 MMOL/L (ref 22–29)
HDLC SERPL-MCNC: 42 MG/DL
LDLC SERPL CALC-MCNC: 49 MG/DL
MICROALBUMIN UR-MCNC: 15.3 MG/L
MICROALBUMIN/CREAT UR: 14.43 MG/G CR (ref 0–17)
NONHDLC SERPL-MCNC: 81 MG/DL
POTASSIUM SERPL-SCNC: 4 MMOL/L (ref 3.4–5.3)
PROT SERPL-MCNC: 7.2 G/DL (ref 6.4–8.3)
SODIUM SERPL-SCNC: 135 MMOL/L (ref 135–145)
TRIGL SERPL-MCNC: 160 MG/DL

## 2024-10-21 PROCEDURE — 80061 LIPID PANEL: CPT | Performed by: STUDENT IN AN ORGANIZED HEALTH CARE EDUCATION/TRAINING PROGRAM

## 2024-10-21 PROCEDURE — 82040 ASSAY OF SERUM ALBUMIN: CPT | Performed by: STUDENT IN AN ORGANIZED HEALTH CARE EDUCATION/TRAINING PROGRAM

## 2024-10-21 PROCEDURE — 82043 UR ALBUMIN QUANTITATIVE: CPT | Performed by: STUDENT IN AN ORGANIZED HEALTH CARE EDUCATION/TRAINING PROGRAM

## 2025-07-13 DIAGNOSIS — G25.0 BENIGN ESSENTIAL TREMOR: ICD-10-CM

## 2025-07-13 NOTE — LETTER
7/13/2025      Antonio Hidalgo  [address]  [place] [address]            We recently provided you with medication refills.  Many medications require routine follow-up with your doctor. As our neurologists are booking out several (6+) months, please contact us at your earliest convenience at [phone] to avoid any interruptions in your medication refills.     Your prescription(s) have been refilled for 30 days so you may have time for the above noted follow-up. Please call to schedule soon so we can assure you have an appointment before your next refills are needed. If you have already made a follow up appointment, please disregard this letter.     Sincerely,  [facility]   Dr. Gould's Care Team

## 2025-07-14 RX ORDER — PRIMIDONE 50 MG/1
100 TABLET ORAL AT BEDTIME
Qty: 60 TABLET | Refills: 0 | Status: SHIPPED | OUTPATIENT
Start: 2025-07-14

## 2025-07-14 NOTE — TELEPHONE ENCOUNTER
Refill request for: primidone (MYSOLINE) 50 MG tablet    Directions: Take 2 tablets (100 mg) by mouth at bedtime - Oral     LOV: 06/05/2024  NOV: None.  Letter sent to patient to make an appointment.     30 day supply with 0 refills Medication T'd for review and signature    Sharon Neumann CMA on 7/14/2025 at 11:23 AM

## 2025-08-28 DIAGNOSIS — G25.0 BENIGN ESSENTIAL TREMOR: ICD-10-CM

## 2025-08-28 RX ORDER — PRIMIDONE 50 MG/1
100 TABLET ORAL AT BEDTIME
Qty: 180 TABLET | Refills: 1 | Status: SHIPPED | OUTPATIENT
Start: 2025-08-28